# Patient Record
Sex: FEMALE | Race: OTHER | Employment: FULL TIME | URBAN - METROPOLITAN AREA
[De-identification: names, ages, dates, MRNs, and addresses within clinical notes are randomized per-mention and may not be internally consistent; named-entity substitution may affect disease eponyms.]

---

## 2017-05-15 ENCOUNTER — OFFICE VISIT (OUTPATIENT)
Dept: FAMILY MEDICINE CLINIC | Age: 37
End: 2017-05-15

## 2017-05-15 VITALS
HEIGHT: 64 IN | WEIGHT: 184 LBS | HEART RATE: 84 BPM | SYSTOLIC BLOOD PRESSURE: 104 MMHG | OXYGEN SATURATION: 98 % | BODY MASS INDEX: 31.41 KG/M2 | TEMPERATURE: 98.6 F | DIASTOLIC BLOOD PRESSURE: 67 MMHG | RESPIRATION RATE: 18 BRPM

## 2017-05-15 DIAGNOSIS — Z01.419 WELL WOMAN EXAM WITH ROUTINE GYNECOLOGICAL EXAM: Primary | ICD-10-CM

## 2017-05-15 DIAGNOSIS — R07.89 ATYPICAL CHEST PAIN: ICD-10-CM

## 2017-05-15 NOTE — PATIENT INSTRUCTIONS

## 2017-05-15 NOTE — MR AVS SNAPSHOT
Visit Information Date & Time Provider Department Dept. Phone Encounter #  
 5/15/2017  8:45 AM Grace Ordonez MD [facility] [phone] [id number] Follow-up Instructions Return in about 1 year (around 5/15/2018). Upcoming Health Maintenance Date Due DTaP/Tdap/Td series (1 - Tdap) 6/11/1999 PAP AKA CERVICAL CYTOLOGY 6/11/1999 INFLUENZA AGE 9 TO ADULT 8/1/2017 Allergies as of 5/15/2017  Review Complete On: 5/15/2017 By: Ned Marino LPN Severity Noted Reaction Type Reactions Latex  05/15/2017    Hives Azithromycin  05/15/2017    Hives Sulfa (Sulfonamide Antibiotics)  05/15/2017    Hives Current Immunizations  Never Reviewed No immunizations on file. Not reviewed this visit You Were Diagnosed With   
  
 Codes Comments Well woman exam with routine gynecological exam    -  Primary ICD-10-CM: W66.926 ICD-9-CM: V72.31 Vitals BP Pulse Temp Resp Height(growth percentile) Weight(growth percentile) 104/67 (BP 1 Location: Right arm, BP Patient Position: Sitting) 84 98.6 °F (37 °C) (Oral) 18 5' 3.75\" (1.619 m) 184 lb (83.5 kg) LMP SpO2 BMI OB Status Smoking Status 04/22/2017 (Exact Date) 98% 31.83 kg/m2 Having regular periods Never Smoker Vitals History BMI and BSA Data Body Mass Index Body Surface Area  
 31.83 kg/m 2 1.94 m 2 Your Updated Medication List  
  
Notice  As of 5/15/2017  9:17 AM  
 You have not been prescribed any medications. We Performed the Following CBC W/O DIFF [48458 CPT(R)] HEMOGLOBIN A1C W/O EAG [10237 CPT(R)] LIPID PANEL [39308 CPT(R)] METABOLIC PANEL, COMPREHENSIVE [07376 CPT(R)] PAP IG, APTIMA HPV AND RFX 16/18,45 ([id number]) [[id number] Custom] Follow-up Instructions Return in about 1 year (around 5/15/2018). Patient Instructions Well Visit, Ages 25 to 48: Care Instructions Your Care Instructions Physical exams can help you stay healthy. Your doctor has checked your overall health and may have suggested ways to take good care of yourself. He or she also may have recommended tests. At home, you can help prevent illness with healthy eating, regular exercise, and other steps. Follow-up care is a key part of your treatment and safety. Be sure to make and go to all appointments, and call your doctor if you are having problems. It's also a good idea to know your test results and keep a list of the medicines you take. How can you care for yourself at home? · Reach and stay at a healthy weight. This will lower your risk for many problems, such as obesity, diabetes, heart disease, and high blood pressure. · Get at least 30 minutes of physical activity on most days of the week. Walking is a good choice. You also may want to do other activities, such as running, swimming, cycling, or playing tennis or team sports. Discuss any changes in your exercise program with your doctor. · Do not smoke or allow others to smoke around you. If you need help quitting, talk to your doctor about stop-smoking programs and medicines. These can increase your chances of quitting for good. · Talk to your doctor about whether you have any risk factors for sexually transmitted infections (STIs). Having one sex partner (who does not have STIs and does not have sex with anyone else) is a good way to avoid these infections. · Use birth control if you do not want to have children at this time. Talk with your doctor about the choices available and what might be best for you. · Protect your skin from too much sun. When you're outdoors from 10 a.m. to 4 p.m., stay in the shade or cover up with clothing and a hat with a wide brim. Wear sunglasses that block UV rays. Even when it's cloudy, put broad-spectrum sunscreen (SPF 30 or higher) on any exposed skin. · See a dentist one or two times a year for checkups and to have your teeth cleaned. · Wear a seat belt in the car. · Drink alcohol in moderation, if at all. That means no more than 2 drinks a day for men and 1 drink a day for women. Follow your doctor's advice about when to have certain tests. These tests can spot problems early. For everyone · Cholesterol. Have the fat (cholesterol) in your blood tested after age 21. Your doctor will tell you how often to have this done based on your age, family history, or other things that can increase your risk for heart disease. · Blood pressure. Have your blood pressure checked during a routine doctor visit. Your doctor will tell you how often to check your blood pressure based on your age, your blood pressure results, and other factors. · Vision. Talk with your doctor about how often to have a glaucoma test. 
· Diabetes. Ask your doctor whether you should have tests for diabetes. · Colon cancer. Have a test for colon cancer at age 48. You may have one of several tests. If you are younger than 48, you may need a test earlier if you have any risk factors. Risk factors include whether you already had a precancerous polyp removed from your colon or whether your parent, brother, sister, or child has had colon cancer. For women · Breast exam and mammogram. Talk to your doctor about when you should have a clinical breast exam and a mammogram. Medical experts differ on whether and how often women under 50 should have these tests. Your doctor can help you decide what is right for you. · Pap test and pelvic exam. Begin Pap tests at age 24. A Pap test is the best way to find cervical cancer. The test often is part of a pelvic exam. Ask how often to have this test. 
· Tests for sexually transmitted infections (STIs). Ask whether you should have tests for STIs. You may be at risk if you have sex with more than one person, especially if your partners do not wear condoms. For men · Tests for sexually transmitted infections (STIs).  Ask whether you should have tests for STIs. You may be at risk if you have sex with more than one person, especially if you do not wear a condom. · Testicular cancer exam. Ask your doctor whether you should check your testicles regularly. · Prostate exam. Talk to your doctor about whether you should have a blood test (called a PSA test) for prostate cancer. Experts differ on whether and when men should have this test. Some experts suggest it if you are older than 39 and are -American or have a father or brother who got prostate cancer when he was younger than 72. When should you call for help? Watch closely for changes in your health, and be sure to contact your doctor if you have any problems or symptoms that concern you. Where can you learn more? Go to http://michelle-charity.info/. Enter P072 in the search box to learn more about \"Well Visit, Ages 25 to 48: Care Instructions. \" Current as of: July 19, 2016 Content Version: 11.2 © 5248-3217 [employer]. Care instructions adapted under license by [employer] (which disclaims liability or warranty for this information). If you have questions about a medical condition or this instruction, always ask your healthcare professional. [place] [address] any warranty or liability for your use of this information. Introducing [facility]! [employer] introduces XO1 patient portal. Now you can access parts of your medical record, email your doctor's office, and request medication refills online. 1. In your internet browser, go to [email or web address] 2. Click on the First Time User? Click Here link in the Sign In box. You will see the New Member Sign Up page. 3. Enter your XO1 Access Code exactly as it appears below. You will not need to use this code after youve completed the sign-up process. If you do not sign up before the expiration date, you must request a new code. · Prosper Access Code: [email or web address] Expires: 8/13/2017  9:17 AM 
 
4. Enter the last four digits of your Social Security Number (xxxx) and Date of Birth (mm/dd/yyyy) as indicated and click Submit. You will be taken to the next sign-up page. 5. Create a Prosper ID. This will be your Prosper login ID and cannot be changed, so think of one that is secure and easy to remember. 6. Create a Prosper password. You can change your password at any time. 7. Enter your Password Reset Question and Answer. This can be used at a later time if you forget your password. 8. Enter your e-mail address. You will receive e-mail notification when new information is available in [address] 9. Click Sign Up. You can now view and download portions of your medical record. 10. Click the Download Summary menu link to download a portable copy of your medical information. If you have questions, please visit the Frequently Asked Questions section of the Prosper website. Remember, Prosper is NOT to be used for urgent needs. For medical emergencies, dial 911. Now available from your iPhone and Android! Please provide this summary of care documentation to your next provider. If you have any questions after today's visit, please call [phone].

## 2017-05-15 NOTE — PROGRESS NOTES
Subjective:   Shantel Castro is an 43 y.o. female who presents for complete physical exam.     Doing well. Health in general is good. Denies smoking or frequent alcohol use. Had tubal ligation. Regular periods. Diet: liberal  Exercise: none    Complains of left sided chest pain. Feels like a \"muscle strain\". Had this worked up before by PCP. EKG was normal. Pain present at any time, no correlation with exercise. Pain may last for days when it comes. Allergies - reviewed: Allergies   Allergen Reactions    Latex Hives    Azithromycin Hives    Sulfa (Sulfonamide Antibiotics) Hives         Medications - reviewed:  No current outpatient prescriptions on file. No current facility-administered medications for this visit. Past Medical History - reviewed:  History reviewed. No pertinent past medical history. Past Surgical History - reviewed:  Past Surgical History:   Procedure Laterality Date    HX TUBAL LIGATION  2005         Family History - reviewed:  History reviewed. No pertinent family history. Social History - reviewed:  Social History     Social History    Marital status:      Spouse name: N/A    Number of children: N/A    Years of education: N/A     Occupational History    Not on file.      Social History Main Topics    Smoking status: Never Smoker    Smokeless tobacco: Not on file    Alcohol use No    Drug use: No    Sexual activity: Yes     Partners: Female     Birth control/ protection: Surgical     Other Topics Concern    Not on file     Social History Narrative    No narrative on file         Review of Systems   CONSTITUTIONAL: Denies: fever  EYES: Denies: decreased vision  ENT: Denies: sore throat  CARDIOVASCULAR: chest pain  RESPIRATORY: Denies: cough  ENDOCRINE: Denies: palpitations  GI: Denies: abdominal pain  : Denies: dysuria  NEURO: Denies: headache  MUSCULOSKELETAL: Denies: joint pain  SKIN: Denies: rash  PSYCH: Denies: anxiety      Objective: Visit Vitals    /67 (BP 1 Location: Right arm, BP Patient Position: Sitting)    Pulse 84    Temp 98.6 °F (37 °C) (Oral)    Resp 18    Ht 5' 3.75\" (1.619 m)    Wt 184 lb (83.5 kg)    LMP 04/22/2017 (Exact Date)    SpO2 98%    BMI 31.83 kg/m2       General appearance - alert, well appearing, and in no distress  Eyes - pupils equal and reactive, extraocular eye movements intact  Ears - bilateral TM's and external ear canals normal  Nose - normal and patent, no erythema, discharge or polyps  Mouth - mucous membranes moist, pharynx normal without lesions  Neck - supple  Chest - clear to auscultation, no wheezes, rales or rhonchi, symmetric air entry  Heart - normal rate, regular rhythm, normal S1, S2, no murmurs  Abdomen - soft, nontender, nondistended  Neurological - alert, oriented, normal speech, no focal findings or movement disorder noted  Musculoskeletal - no joint tenderness, deformity or swelling  Extremities - no pedal edema  Skin - normal coloration and turgor      Assessment:       ICD-10-CM ICD-9-CM    1. Well woman exam with routine gynecological exam Z01.419 V72.31 CBC W/O DIFF      METABOLIC PANEL, COMPREHENSIVE      LIPID PANEL      HEMOGLOBIN A1C W/O EAG      PAP IG, APTIMA HPV AND RFX 16/18,45 ([id number])      STRESS TEST CARDIAC   2. Atypical chest pain R07.89 786.59            Plan:   · Counseled re: diet, exercise, healthy lifestyle    · Appropriate labs done today. · Atypical chest pain - chronic: had this worked up by PCP before, but never had stress test. Will obtain stress test. Patient denied having pain currently. · The patient was counseled on the dangers of tobacco use, and was advised to quit. Reviewed strategies to maximize success, including written materials. Follow-up Disposition:  Return if symptoms worsen or fail to improve. I have discussed the diagnosis with the patient and the intended plan as seen in the above orders.   The patient has received an after-visit summary and questions were answered concerning future plans. I have discussed medication side effects and warnings with the patient as well. Informed pt to return to the office if new symptoms arise.       Stone Briones MD  Family Medicine Resident

## 2017-05-15 NOTE — PROGRESS NOTES
Chief Complaint   Patient presents with   [facility].

## 2017-05-16 LAB
ALBUMIN SERPL-MCNC: 4.4 G/DL (ref 3.5–5.5)
ALBUMIN/GLOB SERPL: 1.5 {RATIO} (ref 1.2–2.2)
ALP SERPL-CCNC: 74 IU/L (ref 39–117)
ALT SERPL-CCNC: 19 IU/L (ref 0–32)
AST SERPL-CCNC: 37 IU/L (ref 0–40)
BILIRUB SERPL-MCNC: 0.4 MG/DL (ref 0–1.2)
BUN SERPL-MCNC: 11 MG/DL (ref 6–20)
BUN/CREAT SERPL: 11 (ref 9–23)
CALCIUM SERPL-MCNC: 9.1 MG/DL (ref 8.7–10.2)
CHLORIDE SERPL-SCNC: 103 MMOL/L (ref 96–106)
CHOLEST SERPL-MCNC: 192 MG/DL (ref 100–199)
CO2 SERPL-SCNC: 22 MMOL/L (ref 18–29)
CREAT SERPL-MCNC: 0.98 MG/DL (ref 0.57–1)
ERYTHROCYTE [DISTWIDTH] IN BLOOD BY AUTOMATED COUNT: 12.8 % (ref 12.3–15.4)
GLOBULIN SER CALC-MCNC: 2.9 G/DL (ref 1.5–4.5)
GLUCOSE SERPL-MCNC: 91 MG/DL (ref 65–99)
HBA1C MFR BLD: 5.9 % (ref 4.8–5.6)
HCT VFR BLD AUTO: 40.6 % (ref 34–46.6)
HDLC SERPL-MCNC: 76 MG/DL
HGB BLD-MCNC: 13.1 G/DL (ref 11.1–15.9)
INTERPRETATION, 910389: NORMAL
LDLC SERPL CALC-MCNC: 98 MG/DL (ref 0–99)
MCH RBC QN AUTO: 29.2 PG (ref 26.6–33)
MCHC RBC AUTO-ENTMCNC: 32.3 G/DL (ref 31.5–35.7)
MCV RBC AUTO: 90 FL (ref 79–97)
PLATELET # BLD AUTO: 237 X10E3/UL (ref 150–379)
POTASSIUM SERPL-SCNC: 4.7 MMOL/L (ref 3.5–5.2)
PROT SERPL-MCNC: 7.3 G/DL (ref 6–8.5)
RBC # BLD AUTO: 4.49 X10E6/UL (ref 3.77–5.28)
SODIUM SERPL-SCNC: 140 MMOL/L (ref 134–144)
TRIGL SERPL-MCNC: 88 MG/DL (ref 0–149)
VLDLC SERPL CALC-MCNC: 18 MG/DL (ref 5–40)
WBC # BLD AUTO: 8.6 X10E3/UL (ref 3.4–10.8)

## 2017-05-18 LAB
CYTOLOGIST CVX/VAG CYTO: ABNORMAL
CYTOLOGY CVX/VAG DOC THIN PREP: ABNORMAL
DX ICD CODE: ABNORMAL
DX ICD CODE: ABNORMAL
HPV I/H RISK 4 DNA CVX QL PROBE+SIG AMP: POSITIVE
Lab: ABNORMAL
OTHER STN SPEC: ABNORMAL
PATH REPORT.FINAL DX SPEC: ABNORMAL
PATHOLOGIST CVX/VAG CYTO: ABNORMAL
STAT OF ADQ CVX/VAG CYTO-IMP: ABNORMAL

## 2017-05-30 ENCOUNTER — TELEPHONE (OUTPATIENT)
Dept: FAMILY MEDICINE CLINIC | Age: 37
End: 2017-05-30

## 2017-05-30 NOTE — TELEPHONE ENCOUNTER
----- Message from Renae Cervantes sent at 5/30/2017  7:57 AM EDT -----  Regarding: Dr. Bard Gaviria telephone  Contact: [phone]  Pt is requesting a call back regarding a letter she received to schedule a colposcopy and she also had some others questions.  Pt's best contact number is [phone]

## 2017-06-02 ENCOUNTER — OFFICE VISIT (OUTPATIENT)
Dept: FAMILY MEDICINE CLINIC | Age: 37
End: 2017-06-02

## 2017-06-02 VITALS
WEIGHT: 184 LBS | SYSTOLIC BLOOD PRESSURE: 109 MMHG | RESPIRATION RATE: 16 BRPM | OXYGEN SATURATION: 98 % | HEART RATE: 63 BPM | HEIGHT: 64 IN | TEMPERATURE: 98.2 F | BODY MASS INDEX: 31.41 KG/M2 | DIASTOLIC BLOOD PRESSURE: 75 MMHG

## 2017-06-02 DIAGNOSIS — R87.629 ABNORMAL VAGINAL PAP SMEAR: Primary | ICD-10-CM

## 2017-06-02 LAB
HCG URINE, QL. (POC): NEGATIVE
VALID INTERNAL CONTROL?: YES

## 2017-06-02 RX ORDER — ALPRAZOLAM 1 MG/1
TABLET ORAL
Qty: 4 TAB | Refills: 0 | Status: SHIPPED | OUTPATIENT
Start: 2017-06-02 | End: 2018-08-08 | Stop reason: SDUPTHER

## 2017-06-02 NOTE — PROGRESS NOTES
Galileo Alatorre is a 42 y.o.  who presented for colposcopy however was unable to tolerate the procedure. She was crying and asking to be sedated. Her history was reviewed below:    Pap smear on 5/15/2017 showed ASCUS HPV positive. There is no prior history of cervical/vaginal disease. There is no prior history of cervical treatment. Past Gyn Procedures/Surgeries: None    Patient's last menstrual period was 2017 (exact date). Sexual history:     Method of contraception:  none    Iodine allergy?:  no    Has hives with latex    UPT negative today      Time out was performed and consent was obtained. However patient tearful and nervous about having the procedure done today. The office air conditioning was also not working and the room was over heated. Procedure Details   The risks and benefits of the procedure and written informed consent obtained. Speculum placed in vagina and it was very difficult to locate and visualize the cervix due to patient clamping down. A second attempt was made by attending Dr. Wing Richmond but was again unsuccessful. Decision was made at that time to stop the procedure and have the patient return with anxiolytic. Assessment:  Colposcopy procedure aborted due to patient inability to tolerate 2/2 anxiety     Plan:  Rx written for xanax   Advised patient to take one tablet the morning of the procedure and then 15-20 minutes immediately prior to colposcopy. She will reschedule in 2 weeks.        Vanessa Norton MD  Family Medicine Resident

## 2017-06-19 ENCOUNTER — OFFICE VISIT (OUTPATIENT)
Dept: FAMILY MEDICINE CLINIC | Age: 37
End: 2017-06-19

## 2017-06-19 VITALS
BODY MASS INDEX: 31.41 KG/M2 | HEIGHT: 64 IN | TEMPERATURE: 98.2 F | RESPIRATION RATE: 18 BRPM | SYSTOLIC BLOOD PRESSURE: 109 MMHG | WEIGHT: 184 LBS | DIASTOLIC BLOOD PRESSURE: 74 MMHG | OXYGEN SATURATION: 98 %

## 2017-06-19 DIAGNOSIS — Z01.818 PRE-PROCEDURAL EXAMINATION: ICD-10-CM

## 2017-06-19 DIAGNOSIS — N87.9 CERVICAL DYSPLASIA: Primary | ICD-10-CM

## 2017-06-19 NOTE — PROGRESS NOTES
Colposcopy Procedure Note    Maria Antonia Sorto is a 42 y.o. who is here for colposcopy. Pap smear on 5/15/17 showed ASCUS, HRHPV+    Dysplasia Hx:   none    Past Gyn Procedures/Surgeries: denies    Method of contraception:  BTL    Iodine allergy?:  No    UPT neg today    [facility]  OFFICE PROCEDURE PROGRESS NOTE    Chart reviewed for the following:   Shantelle CORONEL DO, have reviewed the History, Physical and updated the Allergic reactions for [address] performed immediately prior to start of procedure:   Shantelle CORONEL DO, have performed the following reviews on Maria Antonia Sorto prior to the start of the procedure:            * Patient was identified by name and date of birth   * Agreement on procedure being performed was verified  * Risks and Benefits explained to the patient  * Procedure site verified and marked as necessary  * Patient was positioned for comfort  * Consent was signed and verified     Time: 3:58 PM    Date of procedure: 6/19/2017    Procedure performed by: Ky Ponce DO    Provider assisted by: Alexandria Boston LPN      How tolerated by patient: tolerated the procedure well with no complications    Comments: none      Procedure Details   The risks and benefits of the procedure and written informed consent obtained. Speculum placed in vagina and excellent visualization of cervix achieved, cervix swabbed with acetic acid solution and viewed through colposcope. Findings:  Cervix: SCJ not visualized, in os, no visible lesions seen     Vaginal inspection: normal without visible lesions surrounding cervical edges     Vulvar colposcopy: vulvar colposcopy not performed. Specimens: ECC    Complications: none. Assessment:  42 y.o. s/p colposcopy for cervical dysplasia. Plan:  Specimens labelled and sent to Pathology. Will base further treatment on Pathology findings. Treatment options discussed with patient.   Post biopsy instructions given to patient. Mariela Alva DO  Family Medicine Faculty

## 2017-06-19 NOTE — LETTER
NOTIFICATION RETURN TO WORK / SCHOOL 
 
6/19/2017 4:29 PM 
 
Ms. Ascencion Escobar [id number] [place] [address] [phone] To Whom It May Concern: 
 
Ascencion Escobar is currently under the care of [facility]. She will return to work/school on: 6/21/17 If there are questions or concerns please have the patient contact our office. Sincerely, Shantelle Jamison, DO

## 2017-06-23 LAB
DX ICD CODE: NORMAL
DX ICD CODE: NORMAL
PATH REPORT.FINAL DX SPEC: NORMAL
PATH REPORT.GROSS SPEC: NORMAL
PATH REPORT.SITE OF ORIGIN SPEC: NORMAL
PATHOLOGIST NAME: NORMAL
PAYMENT PROCEDURE: NORMAL

## 2017-07-05 ENCOUNTER — OFFICE VISIT (OUTPATIENT)
Dept: FAMILY MEDICINE CLINIC | Age: 37
End: 2017-07-05

## 2017-07-05 VITALS
DIASTOLIC BLOOD PRESSURE: 74 MMHG | OXYGEN SATURATION: 99 % | BODY MASS INDEX: 31.41 KG/M2 | WEIGHT: 184 LBS | RESPIRATION RATE: 18 BRPM | TEMPERATURE: 98.3 F | SYSTOLIC BLOOD PRESSURE: 109 MMHG | HEIGHT: 64 IN | HEART RATE: 84 BPM

## 2017-07-05 DIAGNOSIS — Z11.59 MEASLES SCREENING: ICD-10-CM

## 2017-07-05 DIAGNOSIS — Z02.89 HISTORY AND PHYSICAL EXAMINATION, OCCUPATION: Primary | ICD-10-CM

## 2017-07-05 DIAGNOSIS — Z02.89 HISTORY AND PHYSICAL EXAMINATION, OCCUPATION: ICD-10-CM

## 2017-07-05 DIAGNOSIS — T78.40XA ALLERGY, INITIAL ENCOUNTER: ICD-10-CM

## 2017-07-05 DIAGNOSIS — Z11.59 SCREENING FOR RUBELLA: ICD-10-CM

## 2017-07-05 NOTE — PATIENT INSTRUCTIONS

## 2017-07-05 NOTE — PROGRESS NOTES
Keely Browne is an 42 y.o. female who presents for immunization titers for new job. Got a new job at [employer] in [place], [place]. Paperwork with her, includes latex allergy screening and immunization titers. No hx of positive titers in past.    No recent travels or possible exposure    Doing well. Health in general is good. Denies smoking or frequent alcohol use. Had tubal ligation. Regular periods. Diet: controlled  Exercise: none    Allergies - reviewed: Allergies   Allergen Reactions    Latex Hives    Azithromycin Hives    Sulfa (Sulfonamide Antibiotics) Hives         Medications - reviewed:   Current Outpatient Prescriptions   Medication Sig    ALPRAZolam (XANAX) 1 mg tablet Take one tab by mouth the morning of procedure and then 20 minutes prior to procedure     No current facility-administered medications for this visit. Past Medical History - reviewed:  No past medical history on file. Past Surgical History - reviewed:   Past Surgical History:   Procedure Laterality Date    HX TUBAL LIGATION  2005         Social History - reviewed:  Social History     Social History    Marital status:      Spouse name: N/A    Number of children: N/A    Years of education: N/A     Occupational History    Not on file. Social History Main Topics    Smoking status: Never Smoker    Smokeless tobacco: Not on file    Alcohol use No    Drug use: No    Sexual activity: Yes     Partners: Female     Birth control/ protection: Surgical     Other Topics Concern    Not on file     Social History Narrative         Family History - reviewed:  No family history on file.       ROS  CONSTITUTIONAL: Denies: fever  EYES: Denies: decreased vision  ENT: Denies: sore throat  CARDIOVASCULAR: Denies: chest pain  RESPIRATORY: Denies: cough  ENDOCRINE: Denies: palpitations  GI: Denies: abdominal pain  : Denies: dysuria  NEURO: Denies: headache  MUSCULOSKELETAL: Denies: joint pain  SKIN: Denies: rash  PSYCH: Denies: anxiety      Physical Exam  Visit Vitals    /74    Pulse 84    Temp 98.3 °F (36.8 °C) (Oral)    Resp 18    Ht 5' 3.75\" (1.619 m)    Wt 184 lb (83.5 kg)    LMP 06/11/2017    SpO2 99%    BMI 31.83 kg/m2       General appearance - alert, well appearing, and in no distress  Eyes - pupils equal and reactive, extraocular eye movements intact  Ears - bilateral TM's and external ear canals normal  Nose - normal and patent, no erythema, discharge or polyps  Mouth - mucous membranes moist, pharynx normal without lesions  Neck - supple  Chest - clear to auscultation, no wheezes, rales or rhonchi, symmetric air entry  Heart - normal rate, regular rhythm, normal S1, S2, no murmurs  Abdomen - soft, nontender, nondistended  Neurological - alert, oriented, normal speech, no focal findings or movement disorder noted  Musculoskeletal - no joint tenderness, deformity or swelling  Extremities - no pedal edema  Skin - normal coloration and turgor    Latex screening: shows that pt is latex sensitive. Assessment/Plan    ICD-10-CM ICD-9-CM    1. History and physical examination, occupation Z02.89 V70.3 VZV AB, IGG      RUBEOLA AB, IGG      MUMPS AB, IGG      RUBELLA AB, IGG   2. Allergy, initial encounter T78.40XA 995.3 REFERRAL TO ALLERGY   3. Screening for rubella Z11.59 V73.3 RUBELLA AB, IGG   4. Measles screening Z11.59 V73.2 RUBEOLA AB, IGG     Titers collected. Paperwork to be completed once the labs are resulted. Gave pt the original copies and will keep a copy in my mail box to complete once the titers resulted. Pt will be notified when all paperwork completed     Based on the paper work from her job, it requires pt obtain an allergy test if latex screening is positive. Therefore, will refer to allergy specialist.     Follow-up Disposition:  Return if symptoms worsen or fail to improve.       I have discussed the diagnosis with the patient and the intended plan as seen in the above orders. Patient verbalized understanding of the plan and agrees with the plan. The patient has received an after-visit summary and questions were answered concerning future plans.         Felix House DO  Family Medicine Resident

## 2017-07-05 NOTE — MR AVS SNAPSHOT
Visit Information Date & Time Provider Department Dept. Phone Encounter #  
 7/5/2017  8:00 AM Jaylen Lawson DO [address] [phone] [id number] Upcoming Health Maintenance Date Due DTaP/Tdap/Td series (1 - Tdap) 6/11/1999 INFLUENZA AGE 9 TO ADULT 8/1/2017 PAP AKA CERVICAL CYTOLOGY 5/15/2020 Allergies as of 7/5/2017  Review Complete On: 7/5/2017 By: Jaylen Lawson DO Severity Noted Reaction Type Reactions Latex  05/15/2017    Hives Azithromycin  05/15/2017    Hives Sulfa (Sulfonamide Antibiotics)  05/15/2017    Hives Current Immunizations  Never Reviewed No immunizations on file. Not reviewed this visit You Were Diagnosed With   
  
 Codes Comments History and physical examination, occupation    -  Primary ICD-10-CM: Z02.89 ICD-9-CM: V70.3 Allergy, initial encounter     ICD-10-CM: T78.40XA ICD-9-CM: 995.3 Screening for rubella     ICD-10-CM: Z11.59 
ICD-9-CM: V73.3 Measles screening     ICD-10-CM: Z11.59 
ICD-9-CM: V73.2 Vitals BP Pulse Temp Resp Height(growth percentile) Weight(growth percentile) 109/74 84 98.3 °F (36.8 °C) (Oral) 18 5' 3.75\" (1.619 m) 184 lb (83.5 kg) LMP SpO2 BMI OB Status Smoking Status 06/11/2017 99% 31.83 kg/m2 Having regular periods Never Smoker Vitals History BMI and BSA Data Body Mass Index Body Surface Area  
 31.83 kg/m 2 1.94 m 2 Preferred Pharmacy Pharmacy Name Phone [facility] [address], [address] [phone] Your Updated Medication List  
  
   
This list is accurate as of: 7/5/17  8:44 AM.  Always use your most recent med list.  
  
  
  
  
 ALPRAZolam 1 mg tablet Commonly known as:  Rosalene Luis A Take one tab by mouth the morning of procedure and then 20 minutes prior to procedure We Performed the Following [address], [address] CPT(R)] REFERRAL TO ALLERGY [REF5 Custom] Comments:  
 Please evaluate patient for allergy testing for latex Aiden Perkins. MD [facility] [address] [place] [address] Phone: [phone] Aren Dela Cruz [facility] [id number] CPT(R)] [facility] [id number] CPT(R)] To-Do List   
 07/05/2017 Lab:  [facility] Referral Information Referral ID Referred By Referred To  
  
 [id number] Ana Gallego Not Available Visits Status Start Date End Date 1 New Request 7/5/17 7/5/18 If your referral has a status of pending review or denied, additional information will be sent to support the outcome of this decision. Patient Instructions Eating Healthy Foods: Care Instructions Your Care Instructions Eating healthy foods can help lower your risk for disease. Healthy food gives you energy and keeps your heart strong, your brain active, your muscles working, and your bones strong. A healthy diet includes a variety of foods from the basic food groups: grains, vegetables, fruits, milk and milk products, and meat and beans. Some people may eat more of their favorite foods from only one food group and, as a result, miss getting the nutrients they need. So, it is important to pay attention not only to what you eat but also to what you are missing from your diet. You can eat a healthy, balanced diet by making a few small changes. Follow-up care is a key part of your treatment and safety. Be sure to make and go to all appointments, and call your doctor if you are having problems. It's also a good idea to know your test results and keep a list of the medicines you take. How can you care for yourself at home? Look at what you eat · Keep a food diary for a week or two and record everything you eat or drink. Track the number of servings you eat from each food group. · For a balanced diet every day, eat a variety of: ¨ 6 or more ounce-equivalents of grains, such as cereals, breads, crackers, rice, or pasta, every day. An ounce-equivalent is 1 slice of bread, 1 cup of ready-to-eat cereal, or ½ cup of cooked rice, cooked pasta, or cooked cereal. 
¨ 2½ cups of vegetables, especially: § Dark-green vegetables such as broccoli and spinach. § Orange vegetables such as carrots and sweet potatoes. § Dry beans (such as espinoza and kidney beans) and peas (such as lentils). ¨ 2 cups of fresh, frozen, or canned fruit. A small apple or 1 banana or orange equals 1 cup. ¨ 3 cups of nonfat or low-fat milk, yogurt, or other milk products. ¨ 5½ ounces of meat and beans, such as chicken, fish, lean meat, beans, nuts, and seeds. One egg, 1 tablespoon of peanut butter, ½ ounce nuts or seeds, or ¼ cup of cooked beans equals 1 ounce of meat. · Learn how to read food labels for serving sizes and ingredients. Fast-food and convenience-food meals often contain few or no fruits or vegetables. Make sure you eat some fruits and vegetables to make the meal more nutritious. · Look at your food diary. For each food group, add up what you have eaten and then divide the total by the number of days. This will give you an idea of how much you are eating from each food group. See if you can find some ways to change your diet to make it more healthy. Start small · Do not try to make dramatic changes to your diet all at once. You might feel that you are missing out on your favorite foods and then be more likely to fail. · Start slowly, and gradually change your habits. Try some of the following: ¨ Use whole wheat bread instead of white bread. ¨ Use nonfat or low-fat milk instead of whole milk. ¨ Eat brown rice instead of white rice, and eat whole wheat pasta instead of white-flour pasta. ¨ Try low-fat cheeses and low-fat yogurt. ¨ Add more fruits and vegetables to meals and have them for snacks. ¨ Add lettuce, tomato, cucumber, and onion to sandwiches. ¨ Add fruit to yogurt and cereal. 
Enjoy food · You can still eat your favorite foods. You just may need to eat less of them. If your favorite foods are high in fat, salt, and sugar, limit how often you eat them, but do not cut them out entirely. · Eat a wide variety of foods. Make healthy choices when eating out · The type of restaurant you choose can help you make healthy choices. Even fast-food chains are now offering more low-fat or healthier choices on the menu. · Choose smaller portions, or take half of your meal home. · When eating out, try: ¨ A veggie pizza with a whole wheat crust or grilled chicken (instead of sausage or pepperoni). ¨ Pasta with roasted vegetables, grilled chicken, or marinara sauce instead of cream sauce. ¨ A vegetable wrap or grilled chicken wrap. ¨ Broiled or poached food instead of fried or breaded items. Make healthy choices easy · Buy packaged, prewashed, ready-to-eat fresh vegetables and fruits, such as baby carrots, salad mixes, and chopped or shredded broccoli and cauliflower. · Buy packaged, presliced fruits, such as melon or pineapple. · Choose 100% fruit or vegetable juice instead of soda. Limit juice intake to 4 to 6 oz (½ to ¾ cup) a day. · Blend low-fat yogurt, fruit juice, and canned or frozen fruit to make a smoothie for breakfast or a snack. Where can you learn more? Go to http://michelle-charity.info/. Enter T756 in the search box to learn more about \"Eating Healthy Foods: Care Instructions. \" Current as of: April 3, 2017 Content Version: 11.3 © 2292-7427 [employer]. Care instructions adapted under license by [employer] (which disclaims liability or warranty for this information). If you have questions about a medical condition or this instruction, always ask your healthcare professional. [place] [address] any warranty or liability for your use of this information. Introducing \[facility]\"" & HEALTH SERVICES! [facility] introduces [employer] patient portal. Now you can access parts of your medical record, email your doctor's office, and request medication refills online. 1. In your internet browser, go to [email or web address] 2. Click on the First Time User? Click Here link in the Sign In box. You will see the New Member Sign Up page. 3. Enter your [employer] Access Code exactly as it appears below. You will not need to use this code after youve completed the sign-up process. If you do not sign up before the expiration date, you must request a new code. · [employer] Access Code: [email or web address] Expires: 8/13/2017  9:17 AM 
 
4. Enter the last four digits of your Social Security Number (xxxx) and Date of Birth (mm/dd/yyyy) as indicated and click Submit. You will be taken to the next sign-up page. 5. Create a [employer] ID. This will be your [employer] login ID and cannot be changed, so think of one that is secure and easy to remember. 6. Create a [employer] password. You can change your password at any time. 7. Enter your Password Reset Question and Answer. This can be used at a later time if you forget your password. 8. Enter your e-mail address. You will receive e-mail notification when new information is available in [address] 9. Click Sign Up. You can now view and download portions of your medical record. 10. Click the Download Summary menu link to download a portable copy of your medical information. If you have questions, please visit the Frequently Asked Questions section of the [employer] website. Remember, [employer] is NOT to be used for urgent needs. For medical emergencies, dial 911. Now available from your iPhone and Android! Please provide this summary of care documentation to your next provider. Your primary care clinician is listed as Grace Holloway. If you have any questions after today's visit, please call [phone].

## 2017-07-06 LAB
MEV IGG SER IA-ACNC: 97.5 AU/ML
MUV IGG SER IA-ACNC: 154 AU/ML
RUBV IGG SERPL IA-ACNC: 21.9 INDEX
VZV IGG SER IA-ACNC: >4000 INDEX

## 2017-08-25 ENCOUNTER — TELEPHONE (OUTPATIENT)
Dept: FAMILY MEDICINE CLINIC | Age: 37
End: 2017-08-25

## 2017-08-25 NOTE — TELEPHONE ENCOUNTER
[phone]  Patient is calling to ask if the history and physical form can be revised as she does not need a stress test.  She is in good health but only the family history has this health issue. Please call when form is ready as she wants to know, but she does want it mailed to her.

## 2017-08-28 NOTE — TELEPHONE ENCOUNTER
Returned call to patient regarding forms. She stated forms had some information about a stress test being needed, Per  and form scan in patient chart no evidence of this.  Asked patient to bring form she is referring to that references her needing a stress test.

## 2017-09-19 NOTE — TELEPHONE ENCOUNTER
Per Patient her insurance company has on file that a stress test was ordered for her. As a result her employer is thinking she has stress issues. Patient is asking that her medical record be amended. Patient notes she complained of a pulled muscle do to working out and states that was the issue for the pain. Please contact patient ASAP with a response.         Phone # [phone]

## 2017-10-02 NOTE — TELEPHONE ENCOUNTER
Spoke with patient and she asked if provider could write stress test not needed on paperwork she dropped off. Will ask provider when she is in office.

## 2017-10-02 NOTE — TELEPHONE ENCOUNTER
Patient is calling back and expressed frustration. Patient states she have been trying for months mow to get her office notes amended to have the mentioning of stress test removed. Patient states she didn't come in with any cardiac issues and only complained of a pulled muscle. Patient states she's trying to get life insurance and with it showing she need to have the stress test, it's causing issues. I relayed message per MD notes and patient wasn't satisfied with that. Patient states she feels as if she's getting the run around. Patient states she didn't receive a call. I verified phone number as [phone] and patient stated that was correct. Call transferred to nurse Laura Tejada.  For further assistance.

## 2017-10-05 ENCOUNTER — DOCUMENTATION ONLY (OUTPATIENT)
Dept: FAMILY MEDICINE CLINIC | Age: 37
End: 2017-10-05

## 2017-10-05 NOTE — PROGRESS NOTES
Patient was seen in the office on 5/15/17 and complained of chest discomfort. At that time, recommended patient have stress testing done. But upon further questioning, she denied chest discomfort. Stated instead that she pulled a muscle. She does not smoke. No fam hx early MI. Will hold off on cardiac testing at this time.

## 2018-07-19 ENCOUNTER — OFFICE VISIT (OUTPATIENT)
Dept: FAMILY MEDICINE CLINIC | Age: 38
End: 2018-07-19

## 2018-07-19 VITALS
DIASTOLIC BLOOD PRESSURE: 77 MMHG | TEMPERATURE: 97.5 F | OXYGEN SATURATION: 99 % | BODY MASS INDEX: 32.1 KG/M2 | RESPIRATION RATE: 16 BRPM | WEIGHT: 188 LBS | HEIGHT: 64 IN | SYSTOLIC BLOOD PRESSURE: 110 MMHG | HEART RATE: 84 BPM

## 2018-07-19 DIAGNOSIS — R73.03 PREDIABETES: ICD-10-CM

## 2018-07-19 DIAGNOSIS — Z11.3 SCREENING FOR STDS (SEXUALLY TRANSMITTED DISEASES): ICD-10-CM

## 2018-07-19 DIAGNOSIS — M79.672 LEFT FOOT PAIN: ICD-10-CM

## 2018-07-19 DIAGNOSIS — R63.5 WEIGHT GAIN: Primary | ICD-10-CM

## 2018-07-19 NOTE — MR AVS SNAPSHOT
[address] 
[phone] Patient: Zachary Guerra MRN: [id number] ZOR:[id number] Visit Information Date & Time Provider Department Dept. Phone Encounter #  
 7/19/2018  9:00 AM Laquita Pires MD [facility] [phone] [id number] Upcoming Health Maintenance Date Due DTaP/Tdap/Td series (1 - Tdap) 6/11/1999 Influenza Age 5 to Adult 8/1/2018 PAP AKA CERVICAL CYTOLOGY 5/15/2020 Allergies as of 7/19/2018  Review Complete On: 7/19/2018 By: Mary Carmen Whitfield LPN Severity Noted Reaction Type Reactions Latex  05/15/2017    Hives Azithromycin  05/15/2017    Hives Sulfa (Sulfonamide Antibiotics)  05/15/2017    Hives Current Immunizations  Never Reviewed No immunizations on file. Not reviewed this visit You Were Diagnosed With   
  
 Codes Comments Weight gain    -  Primary ICD-10-CM: R63.5 ICD-9-CM: 783.1 Prediabetes     ICD-10-CM: R73.03 
ICD-9-CM: 790.29 Screening for STDs (sexually transmitted diseases)     ICD-10-CM: Z11.3 ICD-9-CM: V74.5 Left foot pain     ICD-10-CM: I82.491 ICD-9-CM: 729.5 Vitals BP Pulse Temp Resp Height(growth percentile) Weight(growth percentile) 110/77 84 97.5 °F (36.4 °C) (Oral) 16 5' 3.75\" (1.619 m) 188 lb (85.3 kg) LMP SpO2 BMI OB Status Smoking Status 07/19/2018 99% 32.52 kg/m2 Having regular periods Never Smoker Vitals History BMI and BSA Data Body Mass Index Body Surface Area 32.52 kg/m 2 1.96 m 2 Preferred Pharmacy Pharmacy Name Phone [facility] [address] [place] [address] Drive, [address] [facility] [address] [phone] Your Updated Medication List  
  
   
This list is accurate as of 7/19/18 10:10 AM.  Always use your most recent med list.  
  
  
  
  
 ALPRAZolam 1 mg tablet Commonly known as:  Floyd Lozano Take one tab by mouth the morning of procedure and then 20 minutes prior to procedure We Performed the Following CBC W/O DIFF [57971 CPT(R)] HEMOGLOBIN A1C W/O EAG [00775 CPT(R)] HEPATITIS PANEL, ACUTE [94919 CPT(R)] HIV 1/2 AG/AB, 4TH GENERATION,W RFLX CONFIRM M7194961 CPT(R)] LIPID PANEL [38785 CPT(R)] METABOLIC PANEL, BASIC [70610 CPT(R)] TSH REFLEX TO T4 [40257 CPT(R)] Patient Instructions NSAIDS: Advil, ibuprofen HEEL CUP 
 
_________________________________ Plantar Fasciitis: Care Instructions Your Care Instructions Plantar fasciitis is pain and inflammation of the plantar fascia, the tissue at the bottom of your foot that connects the heel bone to the toes. The plantar fascia also supports the arch. If you strain the plantar fascia, it can develop small tears and cause heel pain when you stand or walk. Plantar fasciitis can be caused by running or other sports. It also may occur in people who are overweight or who have high arches or flat feet. You may get plantar fasciitis if you walk or stand for long periods, or have a tight Achilles tendon or calf muscles. You can improve your foot pain with rest and other care at home. It might take a few weeks to a few months for your foot to heal completely. Follow-up care is a key part of your treatment and safety. Be sure to make and go to all appointments, and call your doctor if you are having problems. It's also a good idea to know your test results and keep a list of the medicines you take. How can you care for yourself at home? · Rest your feet often. Reduce your activity to a level that lets you avoid pain. If possible, do not run or walk on hard surfaces. · Take pain medicines exactly as directed. ¨ If the doctor gave you a prescription medicine for pain, take it as prescribed.  
¨ If you are not taking a prescription pain medicine, take an over-the-counter anti-inflammatory medicine for pain and swelling, such as ibuprofen (Advil, Motrin) or naproxen (Aleve). Read and follow all instructions on the label. · Use ice massage to help with pain and swelling. You can use an ice cube or an ice cup several times a day. To make an ice cup, fill a paper cup with water and freeze it. Cut off the top of the cup until a half-inch of ice shows. Hold onto the remaining paper to use the cup. Rub the ice in small circles over the area for 5 to 7 minutes. · Contrast baths, which alternate hot and cold water, can also help reduce swelling. But because heat alone may make pain and swelling worse, end a contrast bath with a soak in cold water. · Wear a night splint if your doctor suggests it. A night splint holds your foot with the toes pointed up and the foot and ankle at a 90-degree angle. This position gives the bottom of your foot a constant, gentle stretch. · Do simple exercises such as calf stretches and towel stretches 2 to 3 times each day, especially when you first get up in the morning. These can help the plantar fascia become more flexible. They also make the muscles that support your arch stronger. Hold these stretches for 15 to 30 seconds per stretch. Repeat 2 to 4 times. ¨ Stand about 1 foot from a wall. Place the palms of both hands against the wall at chest level. Lean forward against the wall, keeping one leg with the knee straight and heel on the ground while bending the knee of the other leg. ¨ Sit down on the floor or a mat with your feet stretched in front of you. Roll up a towel lengthwise, and loop it over the ball of your foot. Holding the towel at both ends, gently pull the towel toward you to stretch your foot. · Wear shoes with good arch support. Athletic shoes or shoes with a well-cushioned sole are good choices. · Try heel cups or shoe inserts (orthotics) to help cushion your heel. You can buy these at many shoe stores. · Put on your shoes as soon as you get out of bed. Going barefoot or wearing slippers may make your pain worse. · Reach and stay at a good weight for your height. This puts less strain on your feet. When should you call for help? Call your doctor now or seek immediate medical care if: 
  · You have heel pain with fever, redness, or warmth in your heel.  
  · You cannot put weight on the sore foot.  
 Watch closely for changes in your health, and be sure to contact your doctor if: 
  · You have numbness or tingling in your heel.  
  · Your heel pain lasts more than 2 weeks. Where can you learn more? Go to http://michelleGauzycharity.info/. eLn Jensen in the search box to learn more about \"Plantar Fasciitis: Care Instructions. \" Current as of: November 29, 2017 Content Version: 11.7 © 7448-3556 Humanco. Care instructions adapted under license by Continuent (which disclaims liability or warranty for this information). If you have questions about a medical condition or this instruction, always ask your healthcare professional. [place] [address] any warranty or liability for your use of this information. Plantar Fasciitis: Exercises Your Care Instructions Here are some examples of typical rehabilitation exercises for your condition. Start each exercise slowly. Ease off the exercise if you start to have pain. Your doctor or physical therapist will tell you when you can start these exercises and which ones will work best for you. How to do the exercises Towel stretch 1. Sit with your legs extended and knees straight. 2. Place a towel around your foot just under the toes. 3. Hold each end of the towel in each hand, with your hands above your knees. 4. Pull back with the towel so that your foot stretches toward you. 5. Hold the position for at least 15 to 30 seconds. 6. Repeat 2 to 4 times a session, up to 5 sessions a day. Calf stretch This exercise stretches the muscles at the back of the lower leg (the calf) and the Achilles tendon. Do this exercise 3 or 4 times a day, 5 days a week. 1. Stand facing a wall with your hands on the wall at about eye level. Put the leg you want to stretch about a step behind your other leg. 2. Keeping your back heel on the floor, bend your front knee until you feel a stretch in the back leg. 3. Hold the stretch for 15 to 30 seconds. Repeat 2 to 4 times. Plantar fascia and calf stretch Stretching the plantar fascia and calf muscles can increase flexibility and decrease heel pain. You can do this exercise several times each day and before and after activity. 1. Stand on a step as shown above. Be sure to hold on to the banister. 2. Slowly let your heels down over the edge of the step as you relax your calf muscles. You should feel a gentle stretch across the bottom of your foot and up the back of your leg to your knee. 3. Hold the stretch about 15 to 30 seconds, and then tighten your calf muscle a little to bring your heel back up to the level of the step. Repeat 2 to 4 times. Towel curls Make this exercise more challenging by placing a weighted object, such as a soup can, on the other end of the towel. 1. While sitting, place your foot on a towel on the floor and scrunch the towel toward you with your toes. 2. Then, also using your toes, push the towel away from you. [place] pickups 1. Put marbles on the floor next to a cup. 
2. Using your toes, try to lift the marbles up from the floor and put them in the cup. Follow-up care is a key part of your treatment and safety. Be sure to make and go to all appointments, and call your doctor if you are having problems. It's also a good idea to know your test results and keep a list of the medicines you take. Where can you learn more? Go to http://michelle-charity.info/.  
Sudheer Silverman in the search box to learn more about \"Plantar Fasciitis: Exercises. \" Current as of: November 29, 2017 Content Version: 11.7 © 7714-6255 Sym[employer] Computing. Care instructions adapted under license by EdeniQ (which disclaims liability or warranty for this information). If you have questions about a medical condition or this instruction, always ask your healthcare professional. [address] any warranty or liability for your use of this information. Hemoglobin A1c: About This Test 
What is it? Hemoglobin A1c is a blood test that checks your average blood sugar level over the past 2 to 3 months. This test also is called a glycohemoglobin test or an A1c test. 
Why is this test done? The A1c test is done to check how well your diabetes has been controlled over the past 2 to 3 months. Your doctor can use this information to adjust your medicine and diabetes treatment, if needed. This test is also one of the tests used to diagnose diabetes in adults. How can you prepare for the test? 
You don't need to stop eating before you have an A1c test. This test can be done at any time during the day, even after a meal. 
What happens during the test? 
The health professional taking a sample of your blood will: · Wrap an elastic band around your upper arm. This makes the veins below the band larger so it is easier to put a needle into the vein. · Clean the needle site with alcohol. · Put the needle into the vein. · Attach a tube to the needle to fill it with blood. · Remove the band from your arm when enough blood is collected. · Put a gauze pad or cotton ball over the needle site as the needle is removed. · Put pressure on the site and then put on a bandage. What else should you know about the test? 
The test result is usually given as a percentage. The normal A1c is less than 5.7%. You have a higher risk for diabetes if your A1c is 5.7% to 6.4%. If your level is 6.5% or higher, you have diabetes. The A1c test result also can be used to find your estimated average glucose, or eAG. Your eAG and A1c show the same thing in two different ways. They both help you learn more about your average blood sugar range over the past 2 to 3 months. A1c is shown as a percentage, while eAG uses the same units (mg/dl) as your glucose meter. Examples: · 6% A1c = 126 mg/dL · 7% A1c = 154 mg/dL · 8% A1c = 183 mg/dL · 9% A1c = 212 mg/dL · 10% A1c = 240 mg/dL · 11% A1c = 269 mg/dL · 12% A1c = 298 mg/dL Where can you learn more? Go to http://michelle-charity.info/. Enter U216 in the search box to learn more about \"Hemoglobin A1c: About This Test.\" Current as of: December 7, 2017 Content Version: 11.7 © 2632-6072 Imperva. Care instructions adapted under license by Novelos Therapeutics (which disclaims liability or warranty for this information). If you have questions about a medical condition or this instruction, always ask your healthcare professional. [place] [address] any warranty or liability for your use of this information. Prediabetes: Care Instructions Your Care Instructions Prediabetes is a warning sign that you are at risk for getting type 2 diabetes. It means that your blood sugar is higher than it should be. The food you eat turns into sugar, which your body uses for energy. Normally, an organ called the pancreas makes insulin, which allows the sugar in your blood to get into your body's cells. But when your body can't use insulin the right way, the sugar doesn't move into cells. It stays in your blood instead. This is called insulin resistance. The buildup of sugar in the blood causes prediabetes. The good news is that lifestyle changes may help you get your blood sugar back to normal and help you avoid or delay diabetes. Follow-up care is a key part of your treatment and safety.  Be sure to make and go to all appointments, and call your doctor if you are having problems. It's also a good idea to know your test results and keep a list of the medicines you take. How can you care for yourself at home? · Watch your weight. A healthy weight helps your body use insulin properly. · Limit the amount of calories, sweets, and unhealthy fat you eat. Ask your doctor if you should see a dietitian. A registered dietitian can help you create meal plans that fit your lifestyle. · Get at least 30 minutes of exercise on most days of the week. Exercise helps control your blood sugar. It also helps you maintain a healthy weight. Walking is a good choice. You also may want to do other activities, such as running, swimming, cycling, or playing tennis or team sports. · Do not smoke. Smoking can make prediabetes worse. If you need help quitting, talk to your doctor about stop-smoking programs and medicines. These can increase your chances of quitting for good. · If your doctor prescribed medicines, take them exactly as prescribed. Call your doctor if you think you are having a problem with your medicine. You will get more details on the specific medicines your doctor prescribes. When should you call for help? Watch closely for changes in your health, and be sure to contact your doctor if: 
  · You have any symptoms of diabetes. These may include: ¨ Being thirsty more often. ¨ Urinating more. ¨ Being hungrier. ¨ Losing weight. ¨ Being very tired. ¨ Having blurry vision.  
  · You have a wound that will not heal.  
  · You have an infection that will not go away.  
  · You have problems with your blood pressure.  
  · You want more information about diabetes and how you can keep from getting it. Where can you learn more? Go to http://michelle-charity.info/. Enter I222 in the search box to learn more about \"Prediabetes: Care Instructions. \" Current as of: December 7, 2017 Content Version: 11.7 © 1490-6020 Drive YOYO. Care instructions adapted under license by Metrigo (which disclaims liability or warranty for this information). If you have questions about a medical condition or this instruction, always ask your healthcare professional. [address] any warranty or liability for your use of this information. Foot Pain: Care Instructions Your Care Instructions Foot injuries that cause pain and swelling are fairly common. Almost all sports or home repair projects can cause a misstep that ends up as foot pain. Normal wear and tear, especially as you get older, also can cause foot pain. Most minor foot injuries will heal on their own, and home treatment is usually all you need to do. If you have a severe injury, you may need tests and treatment. Follow-up care is a key part of your treatment and safety. Be sure to make and go to all appointments, and call your doctor if you are having problems. It's also a good idea to know your test results and keep a list of the medicines you take. How can you care for yourself at home? · Take pain medicines exactly as directed. ¨ If the doctor gave you a prescription medicine for pain, take it as prescribed. ¨ If you are not taking a prescription pain medicine, ask your doctor if you can take an over-the-counter medicine. · Rest and protect your foot. Take a break from any activity that may cause pain. · Put ice or a cold pack on your foot for 10 to 20 minutes at a time. Put a thin cloth between the ice and your skin. · Prop up the sore foot on a pillow when you ice it or anytime you sit or lie down during the next 3 days. Try to keep it above the level of your heart. This will help reduce swelling. · Your doctor may recommend that you wrap your foot with an elastic bandage. Keep your foot wrapped for as long as your doctor advises. · If your doctor recommends crutches, use them as directed. · Wear roomy footwear. · As soon as pain and swelling end, begin gentle exercises of your foot. Your doctor can tell you which exercises will help. When should you call for help? Call 911 anytime you think you may need emergency care. For example, call if: 
  · Your foot turns pale, white, blue, or cold.  
 Call your doctor now or seek immediate medical care if: 
  · You cannot move or stand on your foot.  
  · Your foot looks twisted or out of its normal position.  
  · Your foot is not stable when you step down.  
  · You have signs of infection, such as: 
¨ Increased pain, swelling, warmth, or redness. ¨ Red streaks leading from the sore area. ¨ Pus draining from a place on your foot. ¨ A fever.  
  · Your foot is numb or tingly.  
 Watch closely for changes in your health, and be sure to contact your doctor if: 
  · You do not get better as expected.  
  · You have bruises from an injury that last longer than 2 weeks. Where can you learn more? Go to http://michelle-charity.info/. Enter Z543 in the search box to learn more about \"Foot Pain: Care Instructions. \" Current as of: November 29, 2017 Content Version: 11.7 © 4543-6784 Rootdown. Care instructions adapted under license by Ease My Sell (which disclaims liability or warranty for this information). If you have questions about a medical condition or this instruction, always ask your healthcare professional. [place] [address] any warranty or liability for your use of this information. Introducing [facility]! [employer] introduces SkyJam patient portal. Now you can access parts of your medical record, email your doctor's office, and request medication refills online. 1. In your internet browser, go to [email or web address] 2. Click on the First Time User? Click Here link in the Sign In box. You will see the New Member Sign Up page. 3. Enter your WritePath Access Code exactly as it appears below. You will not need to use this code after youve completed the sign-up process. If you do not sign up before the expiration date, you must request a new code. · WritePath Access Code: [email or web address] Expires: 10/17/2018 10:03 AM 
 
4. Enter the last four digits of your Social Security Number (xxxx) and Date of Birth (mm/dd/yyyy) as indicated and click Submit. You will be taken to the next sign-up page. 5. Create a WritePath ID. This will be your WritePath login ID and cannot be changed, so think of one that is secure and easy to remember. 6. Create a WritePath password. You can change your password at any time. 7. Enter your Password Reset Question and Answer. This can be used at a later time if you forget your password. 8. Enter your e-mail address. You will receive e-mail notification when new information is available in [address]. 9. Click Sign Up. You can now view and download portions of your medical record. 10. Click the Download Summary menu link to download a portable copy of your medical information. If you have questions, please visit the Frequently Asked Questions section of the WritePath website. Remember, WritePath is NOT to be used for urgent needs. For medical emergencies, dial 911. Now available from your iPhone and Android! Please provide this summary of care documentation to your next provider. Your primary care clinician is listed as Grace Holloway. If you have any questions after today's visit, please call [phone].

## 2018-07-19 NOTE — PROGRESS NOTES
Chief Complaint   Patient presents with    Skin Problem     right arm, bump    Foot Pain     L foot

## 2018-07-19 NOTE — PROGRESS NOTES
.    [address] [facility] [address]   Office [phone], Fax [phone]    Subjective:   Kip Moreno is a 34 y.o. female   CC: Left foot pain and weight control  History provided by patient     HPI:    In her 29's her weight was 150-160lbs. In the last two years she is in the 180s and she wants to work on her weight to be healthy. Her \"eating\" is her issue with patient stating the problem of having no appetite. Diet: fruits (watermellon and cantaloupe) and veggies due to the heat. Stop eating fast food years ago. Does not want to eat generally and has not have a \"real meal\" in a long time. 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 Exercise: Pt started consistently walking/exercising since May 2018 and started jogging in June. She wears running sneakers and started having foot pain. She jogs outside with left foot pain at the inner arch of the foot. The pain is achy and burning and 5/10 severity, not constant. Lots of incline. Jogs 4x/week. SIGECAPS: 4/8 positive for sleep irregularities with decreased energy, appetite and feels slow. No suicidal/homicidal ideation. No past medical history on file. Allergies   Allergen Reactions    Latex Hives    Azithromycin Hives    Sulfa (Sulfonamide Antibiotics) Hives     Current Outpatient Prescriptions on File Prior to Visit   Medication Sig Dispense Refill    ALPRAZolam (XANAX) 1 mg tablet Take one tab by mouth the morning of procedure and then 20 minutes prior to procedure 4 Tab 0     No current facility-administered medications on file prior to visit. No family history on file.   Social History     Social History    Marital status:      Spouse name: N/A    Number of children: N/A    Years of education: N/A     Social History Main Topics    Smoking status: Never Smoker    Smokeless tobacco: Not on file    Alcohol use No    Drug use: No    Sexual activity: Yes     Partners: Female     Birth control/ protection: Surgical     Other Topics Concern    Not on file     Social History Narrative     Past Surgical History:   Procedure Laterality Date    HX TUBAL LIGATION  2005       There is no problem list on file for this patient. Review of Systems   Constitutional: Negative for chills and fever. HENT: Negative for sore throat. Eyes: Negative for pain. Respiratory: Negative for cough. Gastrointestinal: Negative for abdominal pain. Psychiatric/Behavioral: Negative for depression and suicidal ideas. Objective:     Visit Vitals    /77    Pulse 84    Temp 97.5 °F (36.4 °C) (Oral)    Resp 16    Ht 5' 3.75\" (1.619 m)    Wt 188 lb (85.3 kg)    LMP 07/19/2018    SpO2 99%    BMI 32.52 kg/m2        Physical Exam   Constitutional: She appears well-developed and well-nourished. No distress. HENT:   Head: Normocephalic and atraumatic. Eyes: Pupils are equal, round, and reactive to light. Neck: Neck supple. No tracheal deviation present. No thyromegaly present. Cardiovascular: Normal rate, regular rhythm and normal heart sounds. Pulmonary/Chest: Effort normal and breath sounds normal. No respiratory distress. Abdominal: Soft. She exhibits no distension. There is no tenderness. Musculoskeletal:        Left ankle: She exhibits normal range of motion, no swelling and no ecchymosis. Achilles tendon exhibits no pain. Feet:    Tender at insertion point of the plantar fascia attach to calcaneus. Skin: She is not diaphoretic. Psychiatric: She has a normal mood and affect. Her behavior is normal. Thought content normal. She expresses no homicidal and no suicidal ideation. Pertinent Labs/Studies: None. Assessment and orders:     Gilberto Kennedy is a 34 y.o. OTHER female presents with left foot pain and weight gain. No significant medical history. ICD-10-CM ICD-9-CM    1. Weight gain R63.5 783.1 TSH REFLEX TO T4      CBC W/O DIFF   2. Prediabetes F52.55 888.67 METABOLIC PANEL, BASIC      HEMOGLOBIN A1C W/O EAG      LIPID PANEL      CBC W/O DIFF   3. Screening for STDs (sexually transmitted diseases) Z11.3 V74.5 HEPATITIS PANEL, ACUTE      HIV 1/2 AG/AB, 4TH GENERATION,W RFLX CONFIRM   4. Left foot pain M79.672 729.5      Left foot pain, presents with pain especially after recent start of exercise regimen. She has tenderness at the heel where the insertion point of the plantar fascia ligament. She has FROM, 5/5 strength, and can put pressure on the foot. Most likely plantar fasciitis,     -NSAIDs: Take ibuprofen as needed for pain and inflammation  -heel cup insert to help with pain  -Follow up: PRN for foot   -Xray not indicated at this time and patient did not feel the need for XR      Prediabetes  -Referral to Dietician for nutrition counseling  -Last A1c was 5.9, recheck today  -Lipid panel today  -BMP, check renal function  -CBC w/o diff     Weight gain  -No appetite most days, especially in the summer. Has been going on for years. -TSH check  -Does not meet criteria for MDD    STD screen  -sexually active  -requires screening for work  -acute hepatitis panel  -HIV    I have reviewed patient medical and social history and medications. I have reviewed pertinent labs results and other data. I have discussed the diagnosis with the patient and the intended plan as seen in the above orders. The patient has received an after-visit summary and questions were answered concerning future plans. I have discussed medication side effects and warnings with the patient as well.     Patient discussed and seen with Dr. Meseret Ellsworth, Attending Physician    Jayde Sam MD  [address] [place]  Medicine Resident  07/19/18

## 2018-07-19 NOTE — PATIENT INSTRUCTIONS
NSAIDS: Advil, ibuprofen  HEEL CUP    _________________________________     Plantar Fasciitis: Care Instructions  Your Care Instructions    Plantar fasciitis is pain and inflammation of the plantar fascia, the tissue at the bottom of your foot that connects the heel bone to the toes. The plantar fascia also supports the arch. If you strain the plantar fascia, it can develop small tears and cause heel pain when you stand or walk. Plantar fasciitis can be caused by running or other sports. It also may occur in people who are overweight or who have high arches or flat feet. You may get plantar fasciitis if you walk or stand for long periods, or have a tight Achilles tendon or calf muscles. You can improve your foot pain with rest and other care at home. It might take a few weeks to a few months for your foot to heal completely. Follow-up care is a key part of your treatment and safety. Be sure to make and go to all appointments, and call your doctor if you are having problems. It's also a good idea to know your test results and keep a list of the medicines you take. How can you care for yourself at home? · Rest your feet often. Reduce your activity to a level that lets you avoid pain. If possible, do not run or walk on hard surfaces. · Take pain medicines exactly as directed. ¨ If the doctor gave you a prescription medicine for pain, take it as prescribed. ¨ If you are not taking a prescription pain medicine, take an over-the-counter anti-inflammatory medicine for pain and swelling, such as ibuprofen (Advil, Motrin) or naproxen (Aleve). Read and follow all instructions on the label. · Use ice massage to help with pain and swelling. You can use an ice cube or an ice cup several times a day. To make an ice cup, fill a paper cup with water and freeze it. Cut off the top of the cup until a half-inch of ice shows. Hold onto the remaining paper to use the cup.  Rub the ice in small circles over the area for 5 to 7 minutes. · Contrast baths, which alternate hot and cold water, can also help reduce swelling. But because heat alone may make pain and swelling worse, end a contrast bath with a soak in cold water. · Wear a night splint if your doctor suggests it. A night splint holds your foot with the toes pointed up and the foot and ankle at a 90-degree angle. This position gives the bottom of your foot a constant, gentle stretch. · Do simple exercises such as calf stretches and towel stretches 2 to 3 times each day, especially when you first get up in the morning. These can help the plantar fascia become more flexible. They also make the muscles that support your arch stronger. Hold these stretches for 15 to 30 seconds per stretch. Repeat 2 to 4 times. ¨ Stand about 1 foot from a wall. Place the palms of both hands against the wall at chest level. Lean forward against the wall, keeping one leg with the knee straight and heel on the ground while bending the knee of the other leg. ¨ Sit down on the floor or a mat with your feet stretched in front of you. Roll up a towel lengthwise, and loop it over the ball of your foot. Holding the towel at both ends, gently pull the towel toward you to stretch your foot. · Wear shoes with good arch support. Athletic shoes or shoes with a well-cushioned sole are good choices. · Try heel cups or shoe inserts (orthotics) to help cushion your heel. You can buy these at many shoe stores. · Put on your shoes as soon as you get out of bed. Going barefoot or wearing slippers may make your pain worse. · Reach and stay at a good weight for your height. This puts less strain on your feet. When should you call for help?   Call your doctor now or seek immediate medical care if:    · You have heel pain with fever, redness, or warmth in your heel.     · You cannot put weight on the sore foot.    Watch closely for changes in your health, and be sure to contact your doctor if:    · You have numbness or tingling in your heel.     · Your heel pain lasts more than 2 weeks. Where can you learn more? Go to http://michelle-charity.info/. Tracie Blanco in the search box to learn more about \"Plantar Fasciitis: Care Instructions. \"  Current as of: November 29, 2017  Content Version: 11.7  © 0480-3487 [employer]. Care instructions adapted under license by [employer] (which disclaims liability or warranty for this information). If you have questions about a medical condition or this instruction, always ask your healthcare professional. [place] [address] any warranty or liability for your use of this information. Plantar Fasciitis: Exercises  Your Care Instructions  Here are some examples of typical rehabilitation exercises for your condition. Start each exercise slowly. Ease off the exercise if you start to have pain. Your doctor or physical therapist will tell you when you can start these exercises and which ones will work best for you. How to do the exercises  Towel stretch    1. Sit with your legs extended and knees straight. 2. Place a towel around your foot just under the toes. 3. Hold each end of the towel in each hand, with your hands above your knees. 4. Pull back with the towel so that your foot stretches toward you. 5. Hold the position for at least 15 to 30 seconds. 6. Repeat 2 to 4 times a session, up to 5 sessions a day. Calf stretch    This exercise stretches the muscles at the back of the lower leg (the calf) and the Achilles tendon. Do this exercise 3 or 4 times a day, 5 days a week. 1. Stand facing a wall with your hands on the wall at about eye level. Put the leg you want to stretch about a step behind your other leg. 2. Keeping your back heel on the floor, bend your front knee until you feel a stretch in the back leg. 3. Hold the stretch for 15 to 30 seconds. Repeat 2 to 4 times.   Plantar fascia and calf stretch    Stretching the plantar fascia and calf muscles can increase flexibility and decrease heel pain. You can do this exercise several times each day and before and after activity. 1. Stand on a step as shown above. Be sure to hold on to the banister. 2. Slowly let your heels down over the edge of the step as you relax your calf muscles. You should feel a gentle stretch across the bottom of your foot and up the back of your leg to your knee. 3. Hold the stretch about 15 to 30 seconds, and then tighten your calf muscle a little to bring your heel back up to the level of the step. Repeat 2 to 4 times. Towel curls    Make this exercise more challenging by placing a weighted object, such as a soup can, on the other end of the towel. 1. While sitting, place your foot on a towel on the floor and scrunch the towel toward you with your toes. 2. Then, also using your toes, push the towel away from you. [place] pickups    1. Put marbles on the floor next to a cup.  2. Using your toes, try to lift the marbles up from the floor and put them in the cup. Follow-up care is a key part of your treatment and safety. Be sure to make and go to all appointments, and call your doctor if you are having problems. It's also a good idea to know your test results and keep a list of the medicines you take. Where can you learn more? Go to http://michelle-charity.info/. Anette Yuan in the search box to learn more about \"Plantar Fasciitis: Exercises. \"  Current as of: November 29, 2017  Content Version: 11.7  © 1273-0620 [employer]. Care instructions adapted under license by [employer] (which disclaims liability or warranty for this information). If you have questions about a medical condition or this instruction, always ask your healthcare professional. [address] any warranty or liability for your use of this information. Hemoglobin A1c: About This Test  What is it?     Hemoglobin A1c is a blood test that checks your average blood sugar level over the past 2 to 3 months. This test also is called a glycohemoglobin test or an A1c test.  Why is this test done? The A1c test is done to check how well your diabetes has been controlled over the past 2 to 3 months. Your doctor can use this information to adjust your medicine and diabetes treatment, if needed. This test is also one of the tests used to diagnose diabetes in adults. How can you prepare for the test?  You don't need to stop eating before you have an A1c test. This test can be done at any time during the day, even after a meal.  What happens during the test?  The health professional taking a sample of your blood will:  · Wrap an elastic band around your upper arm. This makes the veins below the band larger so it is easier to put a needle into the vein. · Clean the needle site with alcohol. · Put the needle into the vein. · Attach a tube to the needle to fill it with blood. · Remove the band from your arm when enough blood is collected. · Put a gauze pad or cotton ball over the needle site as the needle is removed. · Put pressure on the site and then put on a bandage. What else should you know about the test?  The test result is usually given as a percentage. The normal A1c is less than 5.7%. You have a higher risk for diabetes if your A1c is 5.7% to 6.4%. If your level is 6.5% or higher, you have diabetes. The A1c test result also can be used to find your estimated average glucose, or eAG. Your eAG and A1c show the same thing in two different ways. They both help you learn more about your average blood sugar range over the past 2 to 3 months. A1c is shown as a percentage, while eAG uses the same units (mg/dl) as your glucose meter. Examples:  · 6% A1c = 126 mg/dL  · 7% A1c = 154 mg/dL  · 8% A1c = 183 mg/dL  · 9% A1c = 212 mg/dL  · 10% A1c = 240 mg/dL  · 11% A1c = 269 mg/dL  · 12% A1c = 298 mg/dL  Where can you learn more?   Go to http://michelle-charity.info/. Enter U216 in the search box to learn more about \"Hemoglobin A1c: About This Test.\"  Current as of: December 7, 2017  Content Version: 11.7  © 2006-2018 [employer]. Care instructions adapted under license by [employer] (which disclaims liability or warranty for this information). If you have questions about a medical condition or this instruction, always ask your healthcare professional. [place] [address] any warranty or liability for your use of this information. Prediabetes: Care Instructions  Your Care Instructions    Prediabetes is a warning sign that you are at risk for getting type 2 diabetes. It means that your blood sugar is higher than it should be. The food you eat turns into sugar, which your body uses for energy. Normally, an organ called the pancreas makes insulin, which allows the sugar in your blood to get into your body's cells. But when your body can't use insulin the right way, the sugar doesn't move into cells. It stays in your blood instead. This is called insulin resistance. The buildup of sugar in the blood causes prediabetes. The good news is that lifestyle changes may help you get your blood sugar back to normal and help you avoid or delay diabetes. Follow-up care is a key part of your treatment and safety. Be sure to make and go to all appointments, and call your doctor if you are having problems. It's also a good idea to know your test results and keep a list of the medicines you take. How can you care for yourself at home? · Watch your weight. A healthy weight helps your body use insulin properly. · Limit the amount of calories, sweets, and unhealthy fat you eat. Ask your doctor if you should see a dietitian. A registered dietitian can help you create meal plans that fit your lifestyle. · Get at least 30 minutes of exercise on most days of the week. Exercise helps control your blood sugar. It also helps you maintain a healthy weight. Walking is a good choice. You also may want to do other activities, such as running, swimming, cycling, or playing tennis or team sports. · Do not smoke. Smoking can make prediabetes worse. If you need help quitting, talk to your doctor about stop-smoking programs and medicines. These can increase your chances of quitting for good. · If your doctor prescribed medicines, take them exactly as prescribed. Call your doctor if you think you are having a problem with your medicine. You will get more details on the specific medicines your doctor prescribes. When should you call for help? Watch closely for changes in your health, and be sure to contact your doctor if:    · You have any symptoms of diabetes. These may include:  ¨ Being thirsty more often. ¨ Urinating more. ¨ Being hungrier. ¨ Losing weight. ¨ Being very tired. ¨ Having blurry vision.     · You have a wound that will not heal.     · You have an infection that will not go away.     · You have problems with your blood pressure.     · You want more information about diabetes and how you can keep from getting it. Where can you learn more? Go to http://michelleCorona Labscharity.info/. Enter I222 in the search box to learn more about \"Prediabetes: Care Instructions. \"  Current as of: December 7, 2017  Content Version: 11.7  © 6857-5518 [employer] Care instructions adapted under license by [employer] (which disclaims liability or warranty for this information). If you have questions about a medical condition or this instruction, always ask your healthcare professional. [place] [address] any warranty or liability for your use of this information. Foot Pain: Care Instructions  Your Care Instructions  Foot injuries that cause pain and swelling are fairly common. Almost all sports or home repair projects can cause a misstep that ends up as foot pain.  Normal wear and tear, especially as you get older, also can cause foot pain. Most minor foot injuries will heal on their own, and home treatment is usually all you need to do. If you have a severe injury, you may need tests and treatment. Follow-up care is a key part of your treatment and safety. Be sure to make and go to all appointments, and call your doctor if you are having problems. It's also a good idea to know your test results and keep a list of the medicines you take. How can you care for yourself at home? · Take pain medicines exactly as directed. ¨ If the doctor gave you a prescription medicine for pain, take it as prescribed. ¨ If you are not taking a prescription pain medicine, ask your doctor if you can take an over-the-counter medicine. · Rest and protect your foot. Take a break from any activity that may cause pain. · Put ice or a cold pack on your foot for 10 to 20 minutes at a time. Put a thin cloth between the ice and your skin. · Prop up the sore foot on a pillow when you ice it or anytime you sit or lie down during the next 3 days. Try to keep it above the level of your heart. This will help reduce swelling. · Your doctor may recommend that you wrap your foot with an elastic bandage. Keep your foot wrapped for as long as your doctor advises. · If your doctor recommends crutches, use them as directed. · Wear roomy footwear. · As soon as pain and swelling end, begin gentle exercises of your foot. Your doctor can tell you which exercises will help. When should you call for help? Call 911 anytime you think you may need emergency care.  For example, call if:    · Your foot turns pale, white, blue, or cold.    Call your doctor now or seek immediate medical care if:    · You cannot move or stand on your foot.     · Your foot looks twisted or out of its normal position.     · Your foot is not stable when you step down.     · You have signs of infection, such as:  ¨ Increased pain, swelling, warmth, or redness. ¨ Red streaks leading from the sore area. ¨ Pus draining from a place on your foot. ¨ A fever.     · Your foot is numb or tingly.    Watch closely for changes in your health, and be sure to contact your doctor if:    · You do not get better as expected.     · You have bruises from an injury that last longer than 2 weeks. Where can you learn more? Go to http://michelle-charity.info/. Enter R241 in the search box to learn more about \"Foot Pain: Care Instructions. \"  Current as of: November 29, 2017  Content Version: 11.7  © 9676-1629 [employer]. Care instructions adapted under license by [employer] (which disclaims liability or warranty for this information). If you have questions about a medical condition or this instruction, always ask your healthcare professional. [address] any warranty or liability for your use of this information.

## 2018-07-20 ENCOUNTER — TELEPHONE (OUTPATIENT)
Dept: FAMILY MEDICINE CLINIC | Age: 38
End: 2018-07-20

## 2018-07-20 LAB
BUN SERPL-MCNC: 9 MG/DL (ref 6–24)
BUN/CREAT SERPL: 9 (ref 9–23)
CALCIUM SERPL-MCNC: 9.7 MG/DL (ref 8.7–10.2)
CHLORIDE SERPL-SCNC: 104 MMOL/L (ref 96–106)
CHOLEST SERPL-MCNC: 225 MG/DL (ref 100–199)
CO2 SERPL-SCNC: 21 MMOL/L (ref 20–29)
CREAT SERPL-MCNC: 1.04 MG/DL (ref 0.57–1)
ERYTHROCYTE [DISTWIDTH] IN BLOOD BY AUTOMATED COUNT: 13.1 % (ref 12.3–15.4)
GLUCOSE SERPL-MCNC: 87 MG/DL (ref 65–99)
HAV IGM SERPL QL IA: NEGATIVE
HBA1C MFR BLD: 5.2 % (ref 4.8–5.6)
HBV CORE IGM SERPL QL IA: NEGATIVE
HBV SURFACE AG SERPL QL IA: NEGATIVE
HCT VFR BLD AUTO: 42.8 % (ref 34–46.6)
HCV AB S/CO SERPL IA: <0.1 S/CO RATIO (ref 0–0.9)
HDLC SERPL-MCNC: 78 MG/DL
HGB BLD-MCNC: 13.5 G/DL (ref 11.1–15.9)
HIV 1+2 AB+HIV1 P24 AG SERPL QL IA: NON REACTIVE
INTERPRETATION, 910389: NORMAL
LDLC SERPL CALC-MCNC: 119 MG/DL (ref 0–99)
MCH RBC QN AUTO: 28.7 PG (ref 26.6–33)
MCHC RBC AUTO-ENTMCNC: 31.5 G/DL (ref 31.5–35.7)
MCV RBC AUTO: 91 FL (ref 79–97)
PLATELET # BLD AUTO: 304 X10E3/UL (ref 150–379)
POTASSIUM SERPL-SCNC: 4.3 MMOL/L (ref 3.5–5.2)
RBC # BLD AUTO: 4.71 X10E6/UL (ref 3.77–5.28)
SODIUM SERPL-SCNC: 142 MMOL/L (ref 134–144)
TRIGL SERPL-MCNC: 140 MG/DL (ref 0–149)
TSH SERPL DL<=0.005 MIU/L-ACNC: 3.24 UIU/ML (ref 0.45–4.5)
VLDLC SERPL CALC-MCNC: 28 MG/DL (ref 5–40)
WBC # BLD AUTO: 8.7 X10E3/UL (ref 3.4–10.8)

## 2018-07-20 NOTE — PROGRESS NOTES
I have reviewed all the lab results.  -Acute hep panel: Hep A, B,C negative  -HIV NR  -CBC,BMP, and TSH: WNL  -HgA1c improved from 5.9 to 5.2  -Lipid panel: Total cholesterol is elevated at 225. LDL (bad cholesterol) is elevated at 119. Currently 0.1% 10 year ASCVD risk. Will call patient with results.

## 2018-07-23 ENCOUNTER — HOSPITAL ENCOUNTER (OUTPATIENT)
Dept: LAB | Age: 38
Discharge: HOME | End: 2018-07-23
Payer: COMMERCIAL

## 2018-07-23 ENCOUNTER — OFFICE VISIT (OUTPATIENT)
Dept: FAMILY MEDICINE CLINIC | Age: 38
End: 2018-07-23

## 2018-07-23 VITALS
DIASTOLIC BLOOD PRESSURE: 77 MMHG | HEART RATE: 83 BPM | BODY MASS INDEX: 32.61 KG/M2 | TEMPERATURE: 98 F | RESPIRATION RATE: 11 BRPM | HEIGHT: 64 IN | SYSTOLIC BLOOD PRESSURE: 115 MMHG | WEIGHT: 191 LBS

## 2018-07-23 DIAGNOSIS — R87.610 ATYPICAL SQUAMOUS CELLS OF UNDETERMINED SIGNIFICANCE ON CYTOLOGIC SMEAR OF CERVIX (ASC-US): Primary | ICD-10-CM

## 2018-07-23 DIAGNOSIS — Z01.419 WELL WOMAN EXAM WITH ROUTINE GYNECOLOGICAL EXAM: ICD-10-CM

## 2018-07-23 PROCEDURE — 87625 HPV TYPES 16 & 18 ONLY: CPT | Performed by: FAMILY MEDICINE

## 2018-07-23 PROCEDURE — 87624 HPV HI-RISK TYP POOLED RSLT: CPT | Performed by: FAMILY MEDICINE

## 2018-07-23 PROCEDURE — 88175 CYTOPATH C/V AUTO FLUID REDO: CPT | Performed by: FAMILY MEDICINE

## 2018-07-23 NOTE — PATIENT INSTRUCTIONS
Learning About Cervical Cancer Screening  What is a cervical cancer screening test?    Cervical cancer screening tests check for cancer of the cervix. The cervix is the lower part of the uterus that opens into the vagina. The test can help your doctor find early changes in the cells that could lead to cancer. Two tests can be used to screen for cervical cancer. They may be used alone or together. A Pap test.   This test looks for changes in the cells of the cervix. Abnormal cells may be a sign of cancer. A human papillomavirus (HPV) test.   This test looks for the HPV virus. Some types of HPV can cause cancer. During either test, the doctor or nurse will insert a tool called a speculum into your vagina. The speculum gently spreads apart the vaginal walls. It allows your doctor to see inside the vagina and the cervix. He or she uses a small swab or brush to collect cell samples from your cervix. Try to schedule the test when you're not having your period. To get ready for the test, avoid douches, tampons, vaginal medicines, sprays, and powders for at least a day before you have the test.  When should you have a screening test?  These guidelines apply to women who are at average risk of cervical cancer. If you don't know your risk, talk with your doctor. Women 21 to 34  · You can start having a Pap test at age 24. It is done every 3 years. · You can start having the primary HPV test at age 22. It is done every 3 years. Women 30 to 59  · If you had both a Pap test and an HPV test last time and both were normal, you can have a Pap plus an HPV test every 5 years. · If you had only a Pap test last time, as long as your results are normal, you can have a Pap test every 3 years. · If you had only an HPV test last time, as long as your results are normal, you can have an HPV test every 3 years. Women 72 and older  · If you are age 72 or older, talk with your doctor about what's right for you.  Women ages 72 and older may no longer need to be screened for cervical cancer. When to stop having screening tests depends on your medical history, your overall health, and your risk of cervical cell changes or cervical cancer. What happens after the test?  The sample of cells taken during your test will be sent to a lab so that an expert can look at the cells. It usually takes a week or two to get the results back. Pap tests  · A normal result means that the test didn't find any abnormal cells in the sample. · An abnormal result can mean many things. Most of these aren't cancer. The results of your test may be abnormal because:  ¨ You have an infection of the vagina or cervix, such as a yeast infection. ¨ You have an IUD (intrauterine device for birth control). ¨ You have low estrogen levels after menopause that are causing the cells to change. ¨ You have cell changes that may be a sign of precancer or cancer. The results are ranked based on how serious the changes might be. HPV tests  · A normal result means that the test didn't find any high-risk HPV in the sample. · An abnormal HPV test doesn't mean that you have cervical cancer. It may mean that you are infected with one or more high-risk types of HPV. This increases your chance of having cell changes that may be a sign of precancer or cancer. Follow-up care is a key part of your treatment and safety. Be sure to make and go to all appointments, and call your doctor if you are having problems. It's also a good idea to know your test results and keep a list of the medicines you take. Where can you learn more? Go to http://michelle-charity.info/. Enter P919 in the search box to learn more about \"Learning About Cervical Cancer Screening. \"  Current as of: January 31, 2018  Content Version: 11.7  © 0410-0660 [employer].  Care instructions adapted under license by [employer] (which disclaims liability or warranty for this information). If you have questions about a medical condition or this instruction, always ask your healthcare professional. [place] [address] any warranty or liability for your use of this information.

## 2018-07-23 NOTE — PROGRESS NOTES
HPI       Chief Complaint: Pap     Cecil Fan is a 34 y.o. female who presents for Pap. Was just seen  for annual exam but was on menses at that time    , tubal ligation in   - Pap 2017 ASCUS, HPV+ (all previous Paps were normal)  - 2017 Colposcopy benign, recommended repeat co-testing in 1 year --> this is her one year follow up    - Sexually active but has not been sexually active in >1 year  - Has never had an STD, recently checked for HIV and hepatitis (negative). - Would like chlamydia/gonorrhea checked today  - No vaginal discharge, vaginal itching, or any urinary complaints. PMHx:  No past medical history on file. Meds:   Current Outpatient Prescriptions   Medication Sig Dispense Refill    ALPRAZolam (XANAX) 1 mg tablet Take one tab by mouth the morning of procedure and then 20 minutes prior to procedure 4 Tab 0     Allergies: Allergies   Allergen Reactions    Latex Hives    Azithromycin Hives    Sulfa (Sulfonamide Antibiotics) Hives       Smoker:  History   Smoking Status    Never Smoker   Smokeless Tobacco    Not on file     ETOH:   History   Alcohol Use No     FH:   No family history on file. ROS  Review of Systems   Constitutional: Negative for chills, fever and weight loss. HENT: Negative for congestion, sinus pain and sore throat. Eyes: Negative for blurred vision and double vision. Cardiovascular: Negative for chest pain and palpitations. Gastrointestinal: Negative for abdominal pain, constipation, diarrhea, nausea and vomiting. Genitourinary: Negative for dysuria, frequency and urgency. Neurological: Negative for dizziness, weakness and headaches.        Physical Exam:  Visit Vitals    /77    Pulse 83    Temp 98 °F (36.7 °C) (Oral)    Resp 11    Ht 5' 3.75\" (1.619 m)    Wt 191 lb (86.6 kg)    LMP 2018    BMI 33.04 kg/m2       Wt Readings from Last 3 Encounters:   18 191 lb (86.6 kg)   18 188 lb (85.3 kg) 07/05/17 184 lb (83.5 kg)     BP Readings from Last 3 Encounters:   07/23/18 115/77   07/19/18 110/77   07/05/17 109/74      Physical Exam   Constitutional: She is oriented to person, place, and time. She appears well-developed and well-nourished. Eyes: EOM are normal.   Neck: No thyromegaly present. Cardiovascular: Normal rate and regular rhythm. No murmur heard. Pulmonary/Chest: Effort normal and breath sounds normal. No respiratory distress. She has no wheezes. She has no rales. Abdominal: Soft. Bowel sounds are normal. She exhibits no distension. There is no tenderness. Genitourinary: Vagina normal and uterus normal. No vaginal discharge found. Neurological: She is alert and oriented to person, place, and time. Skin: Skin is warm and dry. Vitals reviewed. I personally reviewed the following lab results:   No results found for this or any previous visit (from the past 12 hour(s)). Assessment     34 y.o. female with:    ICD-10-CM ICD-9-CM    1. Atypical squamous cells of undetermined significance on cytologic smear of cervix (ASC-US) R87.610 795.01 CA SCREEN;PELVIC/BREAST EXAM   2. Well woman exam with routine gynecological exam Z01.419 V72.31 PAP IG, APTIMA HPV AND RFX 16/18,45 ([id number])      CHLAMYDIA/GC PCR      CA SCREEN;PELVIC/BREAST EXAM              Plan       Orders Placed This Encounter    CHLAMYDIA/GC PCR    CA SCREEN;PELVIC/BREAST EXAM    PAP IG, APTIMA HPV AND RFX 16/18,45 ([id number])     Well Woman Exam  - Pap, gc/chlamydia done today after ASCUS Pap with HPV+ and negative colposcopy in 2017  - Health Maintenance - due for Tdap, patient would like to wait to do that. - Would like results of Pap and gc/chlamydia called to her. [phone]. Is personal cell phone, says is ok to leave information on it as she is the only person with access to it.      Patient discussed with Dr. Mary Walker    I have discussed the diagnosis with the patient and the intended plan as seen in the above orders. The patient has received an after-visit summary and questions were answered concerning future plans. I have discussed medication side effects and warnings with the patient as well.     Edward Casey MD  Family Medicine Resident  PGY-2

## 2018-07-23 NOTE — MR AVS SNAPSHOT
[address] 
[phone] Patient: Renate Vera MRN: [id number] GYX:[id number] Visit Information Date & Time Provider Department Dept. Phone Encounter #  
 7/23/2018  3:45 PM Sy Koch, [address] [phone] [id number] Upcoming Health Maintenance Date Due DTaP/Tdap/Td series (1 - Tdap) 6/11/1999 Influenza Age 5 to Adult 8/1/2018 PAP AKA CERVICAL CYTOLOGY 5/15/2020 Allergies as of 7/23/2018  Review Complete On: 7/19/2018 By: Collin Villa LPN Severity Noted Reaction Type Reactions Latex  05/15/2017    Hives Azithromycin  05/15/2017    Hives Sulfa (Sulfonamide Antibiotics)  05/15/2017    Hives Current Immunizations  Reviewed on 7/23/2018 No immunizations on file. Reviewed by Sy Koch MD on 7/23/2018 at  4:21 PM  
You Were Diagnosed With   
  
 Codes Comments Well woman exam with routine gynecological exam    -  Primary ICD-10-CM: V14.573 ICD-9-CM: V72.31 Vitals BP Pulse Temp Resp Height(growth percentile) Weight(growth percentile) 115/77 83 98 °F (36.7 °C) (Oral) 11 5' 3.75\" (1.619 m) 191 lb (86.6 kg) LMP BMI OB Status Smoking Status 07/19/2018 33.04 kg/m2 Having regular periods Never Smoker BMI and BSA Data Body Mass Index Body Surface Area 33.04 kg/m 2 1.97 m 2 Preferred Pharmacy Pharmacy Name Phone [address], [address] [place] [address] [address] [facility] [address] [phone] Your Updated Medication List  
  
   
This list is accurate as of 7/23/18  4:22 PM.  Always use your most recent med list.  
  
  
  
  
 ALPRAZolam 1 mg tablet Commonly known as:  Leward Lennox Take one tab by mouth the morning of procedure and then 20 minutes prior to procedure We Performed the Following CHLAMYDIA/GC PCR [39465 CPT(R)] PAP IG, APTIMA HPV AND RFX 16/18,45 ([id number]) [[id number] Custom] Patient Instructions Learning About Cervical Cancer Screening What is a cervical cancer screening test? 
 
Cervical cancer screening tests check for cancer of the cervix. The cervix is the lower part of the uterus that opens into the vagina. The test can help your doctor find early changes in the cells that could lead to cancer. Two tests can be used to screen for cervical cancer. They may be used alone or together. A Pap test.  
This test looks for changes in the cells of the cervix. Abnormal cells may be a sign of cancer. A human papillomavirus (HPV) test.  
This test looks for the HPV virus. Some types of HPV can cause cancer. During either test, the doctor or nurse will insert a tool called a speculum into your vagina. The speculum gently spreads apart the vaginal walls. It allows your doctor to see inside the vagina and the cervix. He or she uses a small swab or brush to collect cell samples from your cervix. Try to schedule the test when you're not having your period. To get ready for the test, avoid douches, tampons, vaginal medicines, sprays, and powders for at least a day before you have the test. 
When should you have a screening test? 
These guidelines apply to women who are at average risk of cervical cancer. If you don't know your risk, talk with your doctor. Women 21 to 34 
· You can start having a Pap test at age 24. It is done every 3 years. · You can start having the primary HPV test at age 22. It is done every 3 years. Women 30 to 59 · If you had both a Pap test and an HPV test last time and both were normal, you can have a Pap plus an HPV test every 5 years. · If you had only a Pap test last time, as long as your results are normal, you can have a Pap test every 3 years. · If you had only an HPV test last time, as long as your results are normal, you can have an HPV test every 3 years. Women 72 and older · If you are age 72 or older, talk with your doctor about what's right for you. Women ages 72 and older may no longer need to be screened for cervical cancer. When to stop having screening tests depends on your medical history, your overall health, and your risk of cervical cell changes or cervical cancer. What happens after the test? 
The sample of cells taken during your test will be sent to a lab so that an expert can look at the cells. It usually takes a week or two to get the results back. Pap tests · A normal result means that the test didn't find any abnormal cells in the sample. · An abnormal result can mean many things. Most of these aren't cancer. The results of your test may be abnormal because: 
¨ You have an infection of the vagina or cervix, such as a yeast infection. ¨ You have an IUD (intrauterine device for birth control). ¨ You have low estrogen levels after menopause that are causing the cells to change. ¨ You have cell changes that may be a sign of precancer or cancer. The results are ranked based on how serious the changes might be. HPV tests · A normal result means that the test didn't find any high-risk HPV in the sample. · An abnormal HPV test doesn't mean that you have cervical cancer. It may mean that you are infected with one or more high-risk types of HPV. This increases your chance of having cell changes that may be a sign of precancer or cancer. Follow-up care is a key part of your treatment and safety. Be sure to make and go to all appointments, and call your doctor if you are having problems. It's also a good idea to know your test results and keep a list of the medicines you take. Where can you learn more? Go to http://michelle-charity.info/. Enter P919 in the search box to learn more about \"Learning About Cervical Cancer Screening. \" Current as of: January 31, 2018 Content Version: 11.7 © 3236-1529 Healthwise, Incorporated. Care instructions adapted under license by [employer] (which disclaims liability or warranty for this information). If you have questions about a medical condition or this instruction, always ask your healthcare professional. [address] any warranty or liability for your use of this information. Introducing [facility]! [facility] introduces [employer] patient portal. Now you can access parts of your medical record, email your doctor's office, and request medication refills online. 1. In your internet browser, go to [email or web address] 2. Click on the First Time User? Click Here link in the Sign In box. You will see the New Member Sign Up page. 3. Enter your [employer] Access Code exactly as it appears below. You will not need to use this code after youve completed the sign-up process. If you do not sign up before the expiration date, you must request a new code. · [employer] Access Code: [email or web address] Expires: 10/17/2018 10:03 AM 
 
4. Enter the last four digits of your Social Security Number (xxxx) and Date of Birth (mm/dd/yyyy) as indicated and click Submit. You will be taken to the next sign-up page. 5. Create a [employer] ID. This will be your [employer] login ID and cannot be changed, so think of one that is secure and easy to remember. 6. Create a [employer] password. You can change your password at any time. 7. Enter your Password Reset Question and Answer. This can be used at a later time if you forget your password. 8. Enter your e-mail address. You will receive e-mail notification when new information is available in [address] 9. Click Sign Up. You can now view and download portions of your medical record. 10. Click the Download Summary menu link to download a portable copy of your medical information.  
 
If you have questions, please visit the Frequently Asked Questions section of the [employer]. Remember, [email or web address] is NOT to be used for urgent needs. For medical emergencies, dial 911. Now available from your iPhone and Android! Please provide this summary of care documentation to your next provider. Your primary care clinician is listed as Grace Holloway. If you have any questions after today's visit, please call [phone].

## 2018-07-24 ENCOUNTER — TELEPHONE (OUTPATIENT)
Dept: FAMILY MEDICINE CLINIC | Age: 38
End: 2018-07-24

## 2018-07-24 NOTE — TELEPHONE ENCOUNTER
Called patient on Friday twice and today twice on Mobile [phone] and Home [phone] and Today at Mobile and Home phone: [phone]. No one answered phone calls.

## 2018-07-26 LAB
C TRACH RRNA SPEC QL NAA+PROBE: NEGATIVE
N GONORRHOEA RRNA SPEC QL NAA+PROBE: NEGATIVE

## 2018-07-30 ENCOUNTER — TELEPHONE (OUTPATIENT)
Dept: FAMILY MEDICINE CLINIC | Age: 38
End: 2018-07-30

## 2018-07-30 NOTE — LETTER
7/30/2018 1:51 PM 
 
Ms. Blackburn Erm [address] [address] Ms Karon Saver, Your Pap results show that you need another repeat colposcopy. The dates that I am in clinic and able to do this procedure are the following afternoons, all in August: 
 
Tues 7th PM 
Fri 17th PM 
Tues 21st PM 
Fri 24th PM 
Tues 28th PM 
Friday 31st PM 
 
I tried calling you earlier but it went to voicemail, I will try calling later, or you can reply to me via Web and Rankt. Sincerely, Samuel rPice MD

## 2018-07-30 NOTE — TELEPHONE ENCOUNTER
Attempted to call patient to discuss lab results, nobody answered phone call. Pap last week (which is 12 month f/u co-testing after negative colpo done for ASCUS and HPV+) returned LGSIL and HPV+, per ASCCP guidelines patient needs repeat colposcopy    Discussed with Dr. Meseret Ellsworth, would like to schedule patient an appt during dates when Dr. Meseret Ellsworth is precepting and I am available in the clinic.  Per the schedule, these include:  Tues 8/7 in the PM  Fri 8/17 PM  Tues 8/21 PM  Fri 24th PM  Tues 28th PM  Or Friday 31st PM    Will try to call back later and will also send patient message in [address], MD  1:50 PM

## 2018-07-30 NOTE — TELEPHONE ENCOUNTER
Patient called back. We spoke about Pap results and how a repeat colposcopy is recommended. I sent her the dates that I am available via [employer], patient to look at her calendar tonight and will call the clinic to schedule an appointment. Does have some level of anxiety, says last time for the first colposcopy they had to try a few times and it was a bad experience, required some xanax prior to the colposcopy. Stated that once we have a date set I can send in a low dose on time xanax to be taken prior to the procedure.

## 2018-08-06 NOTE — TELEPHONE ENCOUNTER
Patient called stating that she usually has xanax prescribed when she gets her colpo done for her anxiety and is requesting a refill Colpo scheduled for 8/24/18

## 2018-08-08 DIAGNOSIS — F41.9 ANXIETY: Primary | ICD-10-CM

## 2018-08-08 RX ORDER — ALPRAZOLAM 1 MG/1
TABLET ORAL
Qty: 4 TAB | Refills: 0 | Status: SHIPPED | OUTPATIENT
Start: 2018-08-08 | End: 2018-08-15 | Stop reason: SDUPTHER

## 2018-08-08 RX ORDER — ALPRAZOLAM 1 MG/1
TABLET ORAL
Qty: 4 TAB | Refills: 0 | Status: CANCELLED | OUTPATIENT
Start: 2018-08-08

## 2018-08-08 NOTE — TELEPHONE ENCOUNTER
Unable to escribe medication  Have left physical script at the  for patient to   Will ask staff to inform patient

## 2018-08-08 NOTE — TELEPHONE ENCOUNTER
Noticed that patient had already emailed Dr. Rosalba Davis to ask why epresciption was refused  Called patient and informed her a physical script is waiting at the  for her, that xanax cannot be e-prescribed.   She expressed understanding and will pick it up at her convenience

## 2018-08-12 ENCOUNTER — PATIENT MESSAGE (OUTPATIENT)
Dept: FAMILY MEDICINE CLINIC | Age: 38
End: 2018-08-12

## 2018-08-14 DIAGNOSIS — F41.9 ANXIETY: ICD-10-CM

## 2018-08-14 NOTE — TELEPHONE ENCOUNTER
Called patient's pharmacy and spoke to pharmacy tech. Problem occurred because I accidentally listed \"no supervision\" instead of \"indirect supervision\" so no attending ROBERT number appeared on the prescription as it is a controlled substance. Gave Dr. Robyn Oviedo number and pharmacy said it should go through except that patient took the paper prescription with her  and they cannot fill it at this time as they no longer have the prescription. Tech states she will make a note of the attending ROBERT number and when patient brings paper script back she should be able to have it filled. Attempted to call patient's work and mobile to inform her of this but it went to , did not leave .  Will send message through [email or web address] and also update Dr. Liz Pritchett MD

## 2018-08-15 ENCOUNTER — DOCUMENTATION ONLY (OUTPATIENT)
Dept: FAMILY MEDICINE CLINIC | Age: 38
End: 2018-08-15

## 2018-08-15 DIAGNOSIS — F41.9 ANXIETY: ICD-10-CM

## 2018-08-15 RX ORDER — ALPRAZOLAM 1 MG/1
TABLET ORAL
Qty: 4 TAB | Refills: 0 | Status: SHIPPED | OUTPATIENT
Start: 2018-08-15 | End: 2018-08-24 | Stop reason: SDUPTHER

## 2018-08-15 NOTE — PROGRESS NOTES
Patient emailed stating the pharmacy did not return the paper script, she called and spoke with them and they had disposed of it. Have printed out a new prescription, this one with attending's ROBERT on it, and placed at  for patient to . Sent pt a message informing her of this.      Karen Martinez MD

## 2018-08-24 ENCOUNTER — TELEPHONE (OUTPATIENT)
Dept: FAMILY MEDICINE CLINIC | Age: 38
End: 2018-08-24

## 2018-08-24 DIAGNOSIS — F41.9 ANXIETY: ICD-10-CM

## 2018-08-24 RX ORDER — ALPRAZOLAM 1 MG/1
TABLET ORAL
Qty: 3 TAB | Refills: 0 | Status: SHIPPED | OUTPATIENT
Start: 2018-08-24 | End: 2019-10-14 | Stop reason: ALTCHOICE

## 2018-08-24 NOTE — TELEPHONE ENCOUNTER
Issue with Resident ROBERT not being registered to prescribe rx       ALPRAZolam Valdemar Draper) 1 mg tablet      Call transferred , nurse Rona Isaac

## 2018-08-29 ENCOUNTER — DOCUMENTATION ONLY (OUTPATIENT)
Dept: FAMILY MEDICINE CLINIC | Age: 38
End: 2018-08-29

## 2018-09-26 ENCOUNTER — HOSPITAL ENCOUNTER (OUTPATIENT)
Dept: LAB | Age: 38
Discharge: HOME | End: 2018-09-26

## 2018-09-26 ENCOUNTER — OFFICE VISIT (OUTPATIENT)
Dept: FAMILY MEDICINE CLINIC | Age: 38
End: 2018-09-26

## 2018-09-26 VITALS
SYSTOLIC BLOOD PRESSURE: 99 MMHG | HEART RATE: 79 BPM | OXYGEN SATURATION: 97 % | RESPIRATION RATE: 16 BRPM | DIASTOLIC BLOOD PRESSURE: 66 MMHG | BODY MASS INDEX: 31.76 KG/M2 | WEIGHT: 186 LBS | HEIGHT: 64 IN | TEMPERATURE: 99.1 F

## 2018-09-26 DIAGNOSIS — R87.612 LOW GRADE SQUAMOUS INTRAEPITHELIAL LESION ON CYTOLOGIC SMEAR OF CERVIX (LGSIL): Primary | ICD-10-CM

## 2018-09-26 LAB
HCG URINE, QL. (POC): NEGATIVE
VALID INTERNAL CONTROL?: YES

## 2018-09-26 NOTE — PATIENT INSTRUCTIONS
If you have vaginal bleeding more than a period, please let us know. You can expect to have some minor vaginal bleeding and spotting for the next few days. Colposcopy: What to Expect at [address] may feel some soreness in your vagina for a day or two if you had a biopsy. Some vaginal bleeding or discharge is normal for up to a week after a biopsy. The discharge may be dark-colored if a solution was put on your cervix. You can use a sanitary pad for the bleeding. It may take a week or two for you to get the test results. This care sheet gives you a general idea about how long it will take for you to recover. But each person recovers at a different pace. Follow the steps below to feel better as quickly as possible. How can you care for yourself at home? Activity    · You can return to work and most daily activities right after the test.   Exercise    · Do not exercise for 1 day after the test.   Medicines    · Your doctor will tell you if and when you can restart your medicines. He or she will also give you instructions about taking any new medicines.     · If you take blood thinners, such as warfarin (Coumadin), clopidogrel (Plavix), or aspirin, be sure to talk to your doctor. He or she will tell you if and when to start taking those medicines again. Make sure that you understand exactly what your doctor wants you to do.     · Take an over-the-counter pain medicine, such as acetaminophen (Tylenol), ibuprofen (Advil, Motrin), or naproxen (Aleve). Be safe with medicines. Read and follow all instructions on the label. Do not take two or more pain medicines at the same time unless the doctor told you to. Many pain medicines have acetaminophen, which is Tylenol. Too much acetaminophen (Tylenol) can be harmful. Other instructions    · Use a pad if you have some bleeding.     · Do not douche, have sexual intercourse, or use tampons for 1 week if you had a biopsy.  This will allow time for your cervix to heal.     · You can take a bath or shower anytime after the test.   Follow-up care is a key part of your treatment and safety. Be sure to make and go to all appointments, and call your doctor if you are having problems. It's also a good idea to know your test results and keep a list of the medicines you take. When should you call for help? Call your doctor now or seek immediate medical care if:    · You have severe vaginal bleeding. This means that you are soaking through your usual pads or tampons each hour for 2 or more hours.     · You have pain that does not get better after you take pain medicine.     · You have signs of infection, such as:  ¨ Increased pain. ¨ Bad-smelling vaginal discharge. ¨ A fever.    Watch closely for any changes in your health, and be sure to contact your doctor if:    · You have questions or concerns. Where can you learn more? Go to http://michelle-charity.info/. Enter M523 in the search box to learn more about \"Colposcopy: What to Expect at Home. \"  Current as of: May 12, 2017  Content Version: 11.7  © 9682-3392 [employer], Incorporated. Care instructions adapted under license by [employer] (which disclaims liability or warranty for this information). If you have questions about a medical condition or this instruction, always ask your healthcare professional. [address] any warranty or liability for your use of this information.

## 2018-09-26 NOTE — PROGRESS NOTES
Colposcopy Procedure Note    September 26, 2018    Dorethea Creamer  34 y.o.  F [id number]  LMP:  9/10/2018 9/10/2018       Procedure:   Colposcopy     Indications:   5/2017 Pap ASCUS, HPV+ (all previous Paps were normal)  6/2017 Colposcopy benign. Recommended repeat co-testing in 1 year  7/2018 Pap LGSIL, HPV+, repeat colposcopy recommended    Took 1mg xanax before today. UPT negative  Declines flu vaccine today    Preop DX:       ICD-10-CM ICD-9-CM    1. Low grade squamous intraepithelial lesion on cytologic smear of cervix (LGSIL) R87.612 795.03             [facility]  OFFICE PROCEDURE PROGRESS NOTE     Chart reviewed for the following:                        Freeman Michael MD, have reviewed the History, Physical and updated the Allergic reactions for Dorethea Creamer      TIME OUT performed immediately prior to start of procedure:                        IChristian MD have performed the following reviews on Dorethea Creamer prior to the start of the procedure:      * Patient was identified by name and date of birth   * Agreement on procedure being performed was verified  * Risks and Benefits explained to the patient  * Procedure site verified and marked as necessary  * Patient was positioned for comfort  * Consent was signed and verified      Time: 9:40am     Date of procedure: 09/26/18     Procedure performed by:  Christian Cosby MD     Provider assisted by: Dr. Ever Anderson DO and Gemma Quiroga LPN     Procedure Details   The risks and benefits of the procedure and written informed consent obtained. A timeout was taken to verify the patient and procedure to be performed. Speculum placed in vagina and excellent visualization of cervix achieved. Findings:  Cervix:  endocervical speculum placed, cervix swabbed with acetic acid. SCJ visualized. Lesion at 10 o'clock (abnormal vasculature), 12 o'clock (acetowhite changes), and 6 o'clock (acetowhite changes).  Cervical biopsies taken at 10, 12, and 6 o'clock, specimen labelled and sent to pathology. Endocervical curettage performed. Hemostasis achieved with Monsel's solution. Vaginal inspection: normal without visible lesions. Vulvar colposcopy: normal mucosa without lesions. Specimens:  10 o'clock cervical biopsy  12 o'clock cervical biopsy  6 o'clock cervical biopsy  ECC     Complications: none. Plan:    ICD-10-CM ICD-9-CM    1. Low grade squamous intraepithelial lesion on cytologic smear of cervix (LGSIL) R87.612 795.03 AMB POC URINE PREGNANCY TEST, VISUAL COLOR COMPARISON      MN COLPOSC,CERVIX W/ADJ VAG,W/BX & CURRETAG      SURGICAL PATHOLOGY     - UPT negative  - Cervical biopsy and ECC specimens labelled and sent to Pathology.  Will base further treatment on Pathology findings  - Treatment options discussed with patient  - Post biopsy instructions given to patient    Patient seen and discussed with Dr. Larry Saldana DO (attending physician)     Annemarie Juan MD  09/26/18

## 2018-09-26 NOTE — MR AVS SNAPSHOT
[address] 
[phone] Patient: Baudilio Camarillo MRN: [id number] Visit Information Date & Time Provider Department Dept. Phone Encounter #  
 9/26/2018  9:15 AM Iran Olszewski, MD [address] [facility] [phone] [id number] Upcoming Health Maintenance Date Due DTaP/Tdap/Td series (1 - Tdap) 6/11/1999 Influenza Age 5 to Adult 8/1/2018 PAP AKA CERVICAL CYTOLOGY 7/23/2021 Allergies as of 9/26/2018  Review Complete On: 9/26/2018 By: Vertis Fabry, LPN Severity Noted Reaction Type Reactions Latex  05/15/2017    Hives Azithromycin  05/15/2017    Hives Sulfa (Sulfonamide Antibiotics)  05/15/2017    Hives Current Immunizations  Reviewed on 7/23/2018 No immunizations on file. Not reviewed this visit You Were Diagnosed With   
  
 Codes Comments Low grade squamous intraepithelial lesion on cytologic smear of cervix (LGSIL)    -  Primary ICD-10-CM: O43.969 ICD-9-CM: 795.03 Vitals BP Pulse Temp Resp Height(growth percentile) Weight(growth percentile) 99/66 (BP 1 Location: Left arm, BP Patient Position: Sitting) 79 99.1 °F (37.3 °C) (Oral) 16 5' 3.75\" (1.619 m) 186 lb (84.4 kg) LMP SpO2 BMI OB Status Smoking Status 09/10/2018 (Exact Date) 97% 32.18 kg/m2 Having regular periods Never Smoker Vitals History BMI and BSA Data Body Mass Index Body Surface Area  
 32.18 kg/m 2 1.95 m 2 Preferred Pharmacy Pharmacy Name Phone [address], [facility] [address] [phone] Your Updated Medication List  
  
   
This list is accurate as of 9/26/18  9:53 AM.  Always use your most recent med list.  
  
  
  
  
 ALPRAZolam 1 mg tablet Commonly known as:  Sylwia Fell Take one tab by mouth the morning of procedure and then 20 minutes prior to procedure We Performed the Following AMB POC URINE PREGNANCY TEST, VISUAL COLOR COMPARISON [79282 CPT(R)] Patient Instructions If you have vaginal bleeding more than a period, please let us know. You can expect to have some minor vaginal bleeding and spotting for the next few days. Colposcopy: What to Expect at [facility] Your Recovery You may feel some soreness in your vagina for a day or two if you had a biopsy. Some vaginal bleeding or discharge is normal for up to a week after a biopsy. The discharge may be dark-colored if a solution was put on your cervix. You can use a sanitary pad for the bleeding. It may take a week or two for you to get the test results. This care sheet gives you a general idea about how long it will take for you to recover. But each person recovers at a different pace. Follow the steps below to feel better as quickly as possible. How can you care for yourself at home? Activity 
  · You can return to work and most daily activities right after the test.  
Exercise 
  · Do not exercise for 1 day after the test.  
Medicines 
  · Your doctor will tell you if and when you can restart your medicines. He or she will also give you instructions about taking any new medicines.  
  · If you take blood thinners, such as warfarin (Coumadin), clopidogrel (Plavix), or aspirin, be sure to talk to your doctor. He or she will tell you if and when to start taking those medicines again. Make sure that you understand exactly what your doctor wants you to do.  
  · Take an over-the-counter pain medicine, such as acetaminophen (Tylenol), ibuprofen (Advil, Motrin), or naproxen (Aleve). Be safe with medicines. Read and follow all instructions on the label. Do not take two or more pain medicines at the same time unless the doctor told you to. Many pain medicines have acetaminophen, which is Tylenol. Too much acetaminophen (Tylenol) can be harmful. Other instructions   · Use a pad if you have some bleeding.  
  · Do not douche, have sexual intercourse, or use tampons for 1 week if you had a biopsy. This will allow time for your cervix to heal.  
  · You can take a bath or shower anytime after the test.  
Follow-up care is a key part of your treatment and safety. Be sure to make and go to all appointments, and call your doctor if you are having problems. It's also a good idea to know your test results and keep a list of the medicines you take. When should you call for help? Call your doctor now or seek immediate medical care if: 
  · You have severe vaginal bleeding. This means that you are soaking through your usual pads or tampons each hour for 2 or more hours.  
  · You have pain that does not get better after you take pain medicine.  
  · You have signs of infection, such as: 
¨ Increased pain. ¨ Bad-smelling vaginal discharge. ¨ A fever.  
 Watch closely for any changes in your health, and be sure to contact your doctor if: 
  · You have questions or concerns. Where can you learn more? Go to http://michelle-charity.info/. Enter M523 in the search box to learn more about \"Colposcopy: What to Expect at Home. \" Current as of: May 12, 2017 Content Version: 11.7 © 1144-8471 [employer]. Care instructions adapted under license by [employer] (which disclaims liability or warranty for this information). If you have questions about a medical condition or this instruction, always ask your healthcare professional. [place] [address] any warranty or liability for your use of this information. Introducing [facility]! Dear True Lo: Thank you for requesting a [employer] account. Our records indicate that you already have an active [employer] account. You can access your account anytime at [email or web address] Did you know that you can access your hospital and ER discharge instructions at any time in [employer]Cast? You can also review all of your test results from your hospital stay or ER visit. Additional Information If you have questions, please visit the Frequently Asked Questions section of the [employer] website at [email or web address] Remember, FlyCast is NOT to be used for urgent needs. For medical emergencies, dial 911. Now available from your iPhone and Android! Please provide this summary of care documentation to your next provider. Your primary care clinician is listed as Grace Holloway. If you have any questions after today's visit, please call [phone].

## 2018-10-01 NOTE — PROGRESS NOTES
I saw and evaluated the patient, performing the key elements of the service. I discussed the findings, assessment and plan with the resident and agree with the resident's findings and plan as documented in the resident's note. I was present for and supervised the entire procedure. See resident note for details. (Minor procedure <5 minutes)      In addition to above specimens mentioned, also ECC.

## 2018-10-05 ENCOUNTER — DOCUMENTATION ONLY (OUTPATIENT)
Dept: FAMILY MEDICINE CLINIC | Age: 38
End: 2018-10-05

## 2018-10-05 NOTE — PROGRESS NOTES
Reviewed results of colposcopy with Dr. Sharon Gross    Specimen #3 with PADMAJA I  Specimen #4 w dysplastic squamous mucosa but no PADMAJA grading specified. Called the pathology lab. Pathologist is not in at this time, but left message with [facility] (Pathology Coordinators) for pathologist to please re-review the specimen and grade the dysplasia in Specimen #4 either PADMAJA I, II, or III. Clinic phone number and my name was provided to them. Will await response as it will dictate further management.      Vidal Desir MD

## 2018-10-10 ENCOUNTER — DOCUMENTATION ONLY (OUTPATIENT)
Dept: FAMILY MEDICINE CLINIC | Age: 38
End: 2018-10-10

## 2018-10-10 NOTE — PROGRESS NOTES
Received notice that Dr. Sowmya Bey (Pathology) had called the office back but I was not in. Called Dr. Wendy Rivera, went to . Left  with my personal cell phone. Awaiting call back regarding grading of dysplasia of colposcopy.

## 2018-10-11 ENCOUNTER — DOCUMENTATION ONLY (OUTPATIENT)
Dept: FAMILY MEDICINE CLINIC | Age: 38
End: 2018-10-11

## 2018-10-11 NOTE — PROGRESS NOTES
Received call back from Dr. Christelle Boateng (Pathology)    She explained that the ECC cannot be graded PADMAJA I, II, or III, as it is an ECC and not a biopsy and the fragment was not large enough to accurately grade the dysplasia. There was concern as moderate dysplasia was seen with mitotic fragments but it is possible it could have been mild dysplasia and the fragment of tissue was too small to determine. Also reports 2 out of 3 cervical biopsies did not include the transformation zone. Biopsy #3 was in the transformation zone and was PADMAJA I.     - If we interpret the colposcopy results as PADMAJA I, the next step is repeat co-testing at 12 months. - If we interpret the colposcopy results as PADMAJA II, the next step is a diagnostic excisional procedure, though ASCCP guidelines also state that if PADMAJA II or III is identified in post-procedure ECC, cytology and ECC at 4-6 months is preferred.        Will discuss with Dr. Sariah Balderas
room air

## 2018-10-15 ENCOUNTER — TELEPHONE (OUTPATIENT)
Dept: FAMILY MEDICINE CLINIC | Age: 38
End: 2018-10-15

## 2018-10-15 NOTE — TELEPHONE ENCOUNTER
----- Message from [facility] sent at 10/15/2018  9:29 AM EDT -----  Regarding: Dr. Espinoza Reddy  Pt [phone] says that she needs a explanation of her colposcopy results.

## 2018-10-16 NOTE — TELEPHONE ENCOUNTER
Called patient, spoke with her at length regarding colpo results    Needs repeat co-testing in 1 year (9/2019)  Patient grateful for the results and expresses understanding. All questions answered.      Brody Reed MD  10/16/18  12:37 PM

## 2019-10-14 ENCOUNTER — OFFICE VISIT (OUTPATIENT)
Dept: FAMILY MEDICINE CLINIC | Age: 39
End: 2019-10-14

## 2019-10-14 ENCOUNTER — HOSPITAL ENCOUNTER (OUTPATIENT)
Dept: LAB | Age: 39
Discharge: HOME | End: 2019-10-14
Payer: COMMERCIAL

## 2019-10-14 VITALS
OXYGEN SATURATION: 98 % | HEART RATE: 79 BPM | SYSTOLIC BLOOD PRESSURE: 104 MMHG | BODY MASS INDEX: 33.29 KG/M2 | WEIGHT: 195 LBS | HEIGHT: 64 IN | RESPIRATION RATE: 18 BRPM | DIASTOLIC BLOOD PRESSURE: 73 MMHG | TEMPERATURE: 98.9 F

## 2019-10-14 DIAGNOSIS — G47.09 OTHER INSOMNIA: ICD-10-CM

## 2019-10-14 DIAGNOSIS — Z12.39 SCREENING FOR BREAST CANCER: ICD-10-CM

## 2019-10-14 DIAGNOSIS — Z01.419 WELL WOMAN EXAM WITH ROUTINE GYNECOLOGICAL EXAM: ICD-10-CM

## 2019-10-14 DIAGNOSIS — R63.5 WEIGHT GAIN: ICD-10-CM

## 2019-10-14 DIAGNOSIS — Z11.3 SCREENING FOR STDS (SEXUALLY TRANSMITTED DISEASES): Primary | ICD-10-CM

## 2019-10-14 DIAGNOSIS — Z13.220 SCREENING FOR HYPERLIPIDEMIA: ICD-10-CM

## 2019-10-14 DIAGNOSIS — E66.9 OBESITY (BMI 30-39.9): ICD-10-CM

## 2019-10-14 PROCEDURE — 87625 HPV TYPES 16 & 18 ONLY: CPT

## 2019-10-14 PROCEDURE — 87624 HPV HI-RISK TYP POOLED RSLT: CPT

## 2019-10-14 PROCEDURE — 88175 CYTOPATH C/V AUTO FLUID REDO: CPT

## 2019-10-14 NOTE — PROGRESS NOTES
Identified Patient with two Patient identifiers (Name and ). Two Patient Identifiers confirmed. Reviewed record in preparation for visit and have obtained necessary documentation. Chief Complaint   Patient presents with    Well Woman     with pap and labs       Visit Vitals  /73 (BP 1 Location: Right arm, BP Patient Position: Sitting)   Pulse 79   Temp 98.9 °F (37.2 °C) (Oral)   Resp 18   Ht 5' 3.75\" (1.619 m)   Wt 195 lb (88.5 kg)   SpO2 98%   BMI 33.73 kg/m²       1. Have you been to the ER, urgent care clinic since your last visit? Hospitalized since your last visit? No    2. Have you seen or consulted any other health care providers outside of the [address] [place] [address] since your last visit? Include any pap smears or colon screening.  No

## 2019-10-14 NOTE — PROGRESS NOTES
[address] [place] [address]    Subjective:     CC: CPE    History provided by patient     Nelida Deutsch is a 37 y.o. female with no significant PMH coming in for a CPE    Medical Hx and Medications: No medications  Surg Hx: Tubal ligation   FH: Breast  Cancer- grandmother's sister, HTN- maternal grandparents, paternal grandparent- HA  Allergies: Blueberries (hives), Sulfa (hives), Latex (hives)     Preventative measures:   Colonoscopy: NA  TDAP: Discussed, pt will consider   Mammogram: referral sent   PAP: performed today   Shingle vaccine: NA  Pneumococcal vaccine: NA    Social measures:  Smoking: No   Alcohol:No  Exercise: Not yet, has "[employer]" but hard to schedule time due to work commitments  Diet: No sodas, otherwise pt has not made any other dietary changes. States she actually has low caloric intake over past few months since she has been busy    Insomnia: Goes to bed at 9 pm. Is able to fall asleep right away. Does not snore or wakes up SOB. Wakes up around 1.30-2 am. Never gets a full night sleep. Denies anxiety. Has tried melatonin nightly which does not work. Has been ongoing for 6 months. Previous to this has never had sleep problems. Current Outpatient Medications on File Prior to Visit   Medication Sig Dispense Refill    [DISCONTINUED] ALPRAZolam (XANAX) 1 mg tablet Take one tab by mouth the morning of procedure and then 20 minutes prior to procedure 3 Tab 0     No current facility-administered medications on file prior to visit. There is no problem list on file for this patient.       Social History     Socioeconomic History    Marital status:      Spouse name: Not on file    Number of children: Not on file    Years of education: Not on file    Highest education level: Not on file   Occupational History    Not on file   Social Needs    Financial resource strain: Not on file    Food insecurity:     Worry: Not on file     Inability: Not on file   [facility] Transportation needs:     Medical: Not on file     Non-medical: Not on file   Tobacco Use    Smoking status: Never Smoker    Smokeless tobacco: Never Used   Substance and Sexual Activity    Alcohol use: No    Drug use: No    Sexual activity: Yes     Partners: Female     Birth control/protection: Surgical   Lifestyle    Physical activity:     Days per week: Not on file     Minutes per session: Not on file    Stress: Not on file   Relationships    Social connections:     Talks on phone: Not on file     Gets together: Not on file     Attends Hinduism service: Not on file     Active member of club or organization: Not on file     Attends meetings of clubs or organizations: Not on file     Relationship status: Not on file    Intimate partner violence:     Fear of current or ex partner: Not on file     Emotionally abused: Not on file     Physically abused: Not on file     Forced sexual activity: Not on file   Other Topics Concern    Not on file   Social History Narrative    Not on file       Review of Systems   Constitutional: Negative for chills and fever. HENT: Negative for congestion and sore throat. Respiratory: Negative for cough and shortness of breath. Cardiovascular: Negative for chest pain and palpitations. Gastrointestinal: Negative for diarrhea, nausea and vomiting. Genitourinary: Negative for dysuria and urgency. Musculoskeletal: Negative for myalgias. Psychiatric/Behavioral: Negative for depression and suicidal ideas. The patient has insomnia. Objective:     Visit Vitals  /73 (BP 1 Location: Right arm, BP Patient Position: Sitting)   Pulse 79   Temp 98.9 °F (37.2 °C) (Oral)   Resp 18   Ht 5' 3.75\" (1.619 m)   Wt 195 lb (88.5 kg)   LMP 09/25/2019 (Exact Date)   SpO2 98%   BMI 33.73 kg/m²        Physical Exam   Constitutional: She is oriented to person, place, and time. She appears well-developed and well-nourished. Neck: Normal range of motion. Neck supple. Cardiovascular: Normal rate and regular rhythm. Exam reveals no friction rub. No murmur heard. Pulmonary/Chest: Effort normal and breath sounds normal.   Abdominal: Soft. Bowel sounds are normal. She exhibits no distension. There is no tenderness. There is no guarding. Genitourinary: Vagina normal and uterus normal. Pelvic exam was performed with patient prone. There is no rash on the right labia. There is no rash on the left labia. No vaginal discharge found. Neurological: She is alert and oriented to person, place, and time. Skin: Skin is warm and dry. Assessment and orders: Well woman exam with routine gynecological exam:   - CBC W/O DIFF    Screening for STDs (sexually transmitted diseases):  - HIV 1/2 AG/AB, 4TH GENERATION,W RFLX CONFIRM  - HEPATITIS PANEL, ACUTE  - CHLAMYDIA/GC PCR    Obesity (BMI 30-30.9, with recent weight gain): Pt with weight gain wo any increased caloric intake. Does have a FH of thyroid disorder and would like it checked today  - TSH REFLEX TO T4  - Discussed diet and exercise and offered ACAC program to assist, pt declined program at this time     Other insomnia: New onset insomnia over past 6 months. Discussed sleep hygiene.   - SLEEP MEDICINE REFERRAL  - OTC Benadryl 25-50 mg as needed nightly     Prediabetes: Previous A1c 5.2  - Recheck Hgb A1c today     Preventative Measures:  - Pt will see if she has previously received TDAP at her work   - PHANI MAMMO BI SCREENING INCL CAD; Future  - PAP + HPV co testing today     I have discussed the diagnosis with the patient and the intended plan as seen in the above orders. Social history, medical history, and labs were reviewed. The patient has received an after-visit summary and questions were answered concerning future plans. I have discussed medication side effects and warnings with the patient as well.     Jose Valiente DO  Resident [address] [place] [address]  10/14/19    Cased was discussed with  Juliann (attending physician)

## 2019-10-15 LAB
CHOLEST SERPL-MCNC: 199 MG/DL (ref 100–199)
ERYTHROCYTE [DISTWIDTH] IN BLOOD BY AUTOMATED COUNT: 12.4 % (ref 12.3–15.4)
HAV IGM SERPL QL IA: NEGATIVE
HBV CORE IGM SERPL QL IA: NEGATIVE
HBV SURFACE AG SERPL QL IA: NEGATIVE
HCT VFR BLD AUTO: 39.6 % (ref 34–46.6)
HCV AB S/CO SERPL IA: 0.1 S/CO RATIO (ref 0–0.9)
HDLC SERPL-MCNC: 69 MG/DL
HGB BLD-MCNC: 13.4 G/DL (ref 11.1–15.9)
HIV 1+2 AB+HIV1 P24 AG SERPL QL IA: NON REACTIVE
INTERPRETATION, 910389: NORMAL
LDLC SERPL CALC-MCNC: 99 MG/DL (ref 0–99)
MCH RBC QN AUTO: 29.2 PG (ref 26.6–33)
MCHC RBC AUTO-ENTMCNC: 33.8 G/DL (ref 31.5–35.7)
MCV RBC AUTO: 86 FL (ref 79–97)
PLATELET # BLD AUTO: 300 X10E3/UL (ref 150–450)
RBC # BLD AUTO: 4.59 X10E6/UL (ref 3.77–5.28)
TRIGL SERPL-MCNC: 153 MG/DL (ref 0–149)
TSH SERPL DL<=0.005 MIU/L-ACNC: 3.94 UIU/ML (ref 0.45–4.5)
VLDLC SERPL CALC-MCNC: 31 MG/DL (ref 5–40)
WBC # BLD AUTO: 10.8 X10E3/UL (ref 3.4–10.8)

## 2019-10-16 LAB
C TRACH RRNA SPEC QL NAA+PROBE: NEGATIVE
EST. AVERAGE GLUCOSE BLD GHB EST-MCNC: 111 MG/DL
HBA1C MFR BLD: 5.5 % (ref 4.8–5.6)
N GONORRHOEA RRNA SPEC QL NAA+PROBE: NEGATIVE
SPECIMEN STATUS REPORT, ROLRST: NORMAL

## 2019-10-16 NOTE — PROGRESS NOTES
STD testing neg: including HIV, GC and hepatitis testing     A1c: 5.5 (previous 5.2)    Lipid melvina vasquez ASCVD score of 0.1%    Will send mychart message

## 2019-10-21 NOTE — PROGRESS NOTES
I reviewed with the resident the medical history and the resident's findings on the physical examination. I discussed with the resident the patient's diagnosis and concur with the plan. Coco Schilling

## 2019-10-28 ENCOUNTER — TELEPHONE (OUTPATIENT)
Dept: FAMILY MEDICINE CLINIC | Age: 39
End: 2019-10-28

## 2019-10-28 DIAGNOSIS — R87.612 LOW GRADE SQUAMOUS INTRAEPITHELIAL LESION ON CYTOLOGIC SMEAR OF CERVIX (LGSIL): Primary | ICD-10-CM

## 2019-10-28 NOTE — TELEPHONE ENCOUNTER
----- Message from Hermann Spaulding DO sent at 10/24/2019  3:07 PM EDT -----  Regarding: PAP results  Hi Dr. Johnson Post,     Pt is s/p colpo last year and I did the co testing last week which came back + HPV and LSIL. Per algorithm we will proceed with colpo at this time. (This will be her 3rd colpo)    Just wanted to check in with you before I schedule it. This will be my 1st colpo in out clinic!     Aleksandra     ----- Message -----  From: [facility] Results  Sent: 10/24/2019   1:43 PM EDT  To: Hermann Spaulding DO

## 2019-10-28 NOTE — TELEPHONE ENCOUNTER
Dr. Ellie Noyola called patient to schedule. Patient will call back to see when she can come in and take off work.

## 2019-10-30 RX ORDER — ALPRAZOLAM 1 MG/1
1 TABLET ORAL ONCE
Qty: 1 TAB | Refills: 0 | Status: SHIPPED | OUTPATIENT
Start: 2019-10-30 | End: 2019-10-30

## 2019-10-30 NOTE — TELEPHONE ENCOUNTER
Called patient and scheduled colposcopy with Dr. Kandi Schuler on November 11th. Patient requesting prescription for Xanax before procedure. She states she cannot get procedure unless she takes this medication.

## 2020-02-06 ENCOUNTER — HOSPITAL ENCOUNTER (OUTPATIENT)
Dept: LAB | Age: 40
Discharge: HOME | End: 2020-02-06

## 2020-02-06 ENCOUNTER — OFFICE VISIT (OUTPATIENT)
Dept: FAMILY MEDICINE CLINIC | Age: 40
End: 2020-02-06

## 2020-02-06 VITALS
BODY MASS INDEX: 33.12 KG/M2 | WEIGHT: 194 LBS | TEMPERATURE: 99 F | RESPIRATION RATE: 16 BRPM | DIASTOLIC BLOOD PRESSURE: 73 MMHG | SYSTOLIC BLOOD PRESSURE: 110 MMHG | HEART RATE: 83 BPM | HEIGHT: 64 IN | OXYGEN SATURATION: 99 %

## 2020-02-06 DIAGNOSIS — N89.8 VAGINAL ITCHING: ICD-10-CM

## 2020-02-06 DIAGNOSIS — N76.0 BACTERIAL VAGINOSIS: ICD-10-CM

## 2020-02-06 DIAGNOSIS — N89.8 VAGINAL ITCHING: Primary | ICD-10-CM

## 2020-02-06 DIAGNOSIS — B96.89 BACTERIAL VAGINOSIS: ICD-10-CM

## 2020-02-06 LAB
BILIRUB UR QL STRIP: NEGATIVE
GLUCOSE UR-MCNC: NEGATIVE MG/DL
KETONES P FAST UR STRIP-MCNC: NEGATIVE MG/DL
PH UR STRIP: 7 [PH] (ref 4.6–8)
PROT UR QL STRIP: NEGATIVE
SP GR UR STRIP: 1.02 (ref 1–1.03)
UA UROBILINOGEN AMB POC: NORMAL (ref 0.2–1)
URINALYSIS CLARITY POC: CLEAR
URINALYSIS COLOR POC: YELLOW
URINE BLOOD POC: NEGATIVE
URINE LEUKOCYTES POC: NEGATIVE
URINE NITRITES POC: NEGATIVE
WET MOUNT POCT, WMPOCT: NORMAL

## 2020-02-06 RX ORDER — METRONIDAZOLE 500 MG/1
500 TABLET ORAL 2 TIMES DAILY
Qty: 14 TAB | Refills: 0 | Status: SHIPPED | OUTPATIENT
Start: 2020-02-06 | End: 2020-02-13

## 2020-02-06 NOTE — PROGRESS NOTES
Chief Complaint   Patient presents with    Vaginal Itching     1. Have you been to the ER, urgent care clinic since your last visit? Hospitalized since your last visit? No    2. Have you seen or consulted any other health care providers outside of the [address] [place] [address] since your last visit? Include any pap smears or colon screening.  No

## 2020-02-06 NOTE — PATIENT INSTRUCTIONS
Schedule an appointment to return for your colposcopy!!! Bacterial Vaginosis: Care Instructions  Your Care Instructions    Bacterial vaginosis is a type of vaginal infection. It is caused by excess growth of certain bacteria that are normally found in the vagina. Symptoms can include itching, swelling, pain when you urinate or have sex, and a gray or yellow discharge with a \"fishy\" odor. It is not considered an infection that is spread through sexual contact. Although symptoms can be annoying and uncomfortable, bacterial vaginosis does not usually cause other health problems. However, if you have it while you are pregnant, it can cause complications. While the infection may go away on its own, most doctors use antibiotics to treat it. You may have been prescribed pills or vaginal cream. With treatment, bacterial vaginosis usually clears up in 5 to 7 days. Follow-up care is a key part of your treatment and safety. Be sure to make and go to all appointments, and call your doctor if you are having problems. It's also a good idea to know your test results and keep a list of the medicines you take. How can you care for yourself at home? · Take your antibiotics as directed. Do not stop taking them just because you feel better. You need to take the full course of antibiotics. · Do not eat or drink anything that contains alcohol if you are taking metronidazole (Flagyl). · Keep using your medicine if you start your period. Use pads instead of tampons while using a vaginal cream or suppository. Tampons can absorb the medicine. · Wear loose cotton clothing. Do not wear nylon and other materials that hold body heat and moisture close to the skin. · Do not scratch. Relieve itching with a cold pack or a cool bath. · Do not wash your vaginal area more than once a day. Use plain water or a mild, unscented soap. Do not douche. When should you call for help?   Watch closely for changes in your health, and be sure to contact your doctor if:    · You have unexpected vaginal bleeding.     · You have a fever.     · You have new or increased pain in your vagina or pelvis.     · You are not getting better after 1 week.     · Your symptoms return after you finish the course of your medicine. Where can you learn more? Go to http://michelle-charity.info/. [place] in the search box to learn more about \"Bacterial Vaginosis: Care Instructions. \"  Current as of: February 19, 2019  Content Version: 12.2  © 9739-3316 [employer]. Care instructions adapted under license by [employer] (which disclaims liability or warranty for this information). If you have questions about a medical condition or this instruction, always ask your healthcare professional. [address] any warranty or liability for your use of this information.

## 2020-02-06 NOTE — PROGRESS NOTES
HPI       Chief Complaint: vaginal itching    Brea Cote is a 37 y.o. female who presents vaginal itching    Vaginal itching - x 2 days. believes she has a yeast infection. No vaginal discharge. Present throughout the day but seems to be worse at night. No dysuria, changes in urinary frequency/urgency. Last yeast infection was many years ago. Pt is sexually active but no intercourse in 2-3 mos. No concern for STDs. No visible ulcerations or lesions. Last Pap 10/2019, pt is due for colposcopy but has not scheduled this at this time. LMP was 1/27-1/31/2020. PMHx:  History reviewed. No pertinent past medical history. Meds:     Allergies: Allergies   Allergen Reactions    Latex Hives    Azithromycin Hives    Sulfa (Sulfonamide Antibiotics) Hives     ROS  Review of Systems   Constitutional: Negative for chills, fever and weight loss. Gastrointestinal: Negative for abdominal pain, constipation and diarrhea. Genitourinary: Negative for dysuria, frequency and urgency. +vaginal itching   Skin: Negative for rash. Physical Exam:  Visit Vitals  /73 (BP 1 Location: Left arm, BP Patient Position: Sitting)   Pulse 83   Temp 99 °F (37.2 °C) (Oral)   Resp 16   Ht 5' 3.75\" (1.619 m)   Wt 194 lb (88 kg)   LMP 01/27/2020   SpO2 99%   BMI 33.56 kg/m²       Wt Readings from Last 3 Encounters:   02/06/20 194 lb (88 kg)   10/14/19 195 lb (88.5 kg)   09/26/18 186 lb (84.4 kg)     BP Readings from Last 3 Encounters:   02/06/20 110/73   10/14/19 104/73   09/26/18 99/66      Physical Exam  Vitals signs reviewed. Constitutional:       Appearance: She is well-developed. HENT:      Head: Normocephalic and atraumatic. Neck:      Musculoskeletal: Normal range of motion and neck supple. Thyroid: No thyromegaly. Cardiovascular:      Rate and Rhythm: Normal rate and regular rhythm. Heart sounds: No murmur.    Pulmonary:      Effort: Pulmonary effort is normal. No respiratory distress. Breath sounds: Normal breath sounds. No wheezing or rales. Genitourinary:     Comments: Pelvic exam chaperoned by Rhonda Degroot LPN. External genitalia wnl, no visible lesions. Vaginal canal with thin watery discharge present. Cervix normal in appearance, though very retroverted. No cervical motion tenderness. Neurological:      Mental Status: She is alert. I personally reviewed the following lab results:   Recent Results (from the past 12 hour(s))   AMB POC SMEAR, STAIN & INTERPRET, WET MOUNT    Collection Time: 02/06/20  9:06 AM   Result Value Ref Range    Wet mount (POC)               Assessment     37 y.o. female with:    ICD-10-CM ICD-9-CM    1. Vaginal itching N89.8 698.1 AMB POC SMEAR, STAIN & INTERPRET, WET MOUNT      CHLAMYDIA/GC PCR      AMB POC URINALYSIS DIP STICK AUTO W/O MICRO   2. Bacterial vaginosis N76.0 616.10 metroNIDAZOLE (FLAGYL) 500 mg tablet    B96.89 041.9               Plan     1. Vaginal itching  Wet prep personally interpreted, no yeast but significant clue cells. Will also check Gc/chlamydia. Pt denied testing for syphilis or HIV. UA negative. - AMB POC SMEAR, STAIN & INTERPRET, WET MOUNT  - CHLAMYDIA/GC PCR; Future  - AMB POC URINALYSIS DIP STICK AUTO W/O MICRO    2. Bacterial vaginosis  - metroNIDAZOLE (FLAGYL) 500 mg tablet; Take 1 Tab by mouth two (2) times a day for 7 days. Dispense: 14 Tab; Refill: 0      Patient is aware that she is extremely overdue for her colposcopy. We discussed the importance of scheduling this at length today. She expressed understanding. Will remind myself to check in 1 week if she has scheduled appt. Follow-up and Dispositions    · Return if symptoms worsen or fail to improve, for Also schedule appt to return for colposcopy! .        Patient discussed with Dr. Jud Calhoun (Attending Physician)    I have discussed the diagnosis with the patient and the intended plan as seen in the above orders.  The patient has received an after-visit summary and questions were answered concerning future plans. I have discussed medication side effects and warnings with the patient as well.     Lanie Phillips MD  Family Medicine Resident  PGY-3

## 2020-02-07 LAB
C TRACH DNA SPEC QL NAA+PROBE: NEGATIVE
N GONORRHOEA DNA SPEC QL NAA+PROBE: NEGATIVE
SAMPLE TYPE: NORMAL
SERVICE CMNT-IMP: NORMAL
SPECIMEN SOURCE: NORMAL

## 2020-02-07 NOTE — PROGRESS NOTES
[address] Medicine Residency Attending Addendum:  Patient encounter was discussed on the day of the encounter with Zak Hameed MD, performing the key elements of the service. I discussed the findings, assessment and plan with the resident and agree with the resident's findings and plan as documented in the resident's note.       Connie Turcios MD, CAQSM, RMSK

## 2020-10-20 ENCOUNTER — VIRTUAL VISIT (OUTPATIENT)
Dept: FAMILY MEDICINE CLINIC | Age: 40
End: 2020-10-20

## 2020-10-20 DIAGNOSIS — T50.B95A ADVERSE EFFECT OF INFLUENZA VACCINE, INITIAL ENCOUNTER: Primary | ICD-10-CM

## 2020-10-20 PROCEDURE — 99213 OFFICE O/P EST LOW 20 MIN: CPT | Performed by: STUDENT IN AN ORGANIZED HEALTH CARE EDUCATION/TRAINING PROGRAM

## 2020-10-20 NOTE — PROGRESS NOTES
Yomaira Polanco  41 y.o. female  1980  [address] [facility]  [id number]   [address]:    Telemedicine Progress Note  Ary Aparicio DO       Encounter Date and Time: October 20, 2020 at 4:04 PM    Consent: Yomaira Polanco, who was seen by synchronous (real-time) audio-video technology, and/or her healthcare decision maker, is aware that this patient-initiated, Telehealth encounter on 10/20/2020 is a billable service, with coverage as determined by her insurance carrier. She is aware that she may receive a bill and has provided verbal consent to proceed: Yes. Chief Complaint   Patient presents with    Form Completion     History of Present Illness   Yomaira Polanco is a 41 y.o. female was evaluated by synchronous (real-time) audio-video technology from home, through a secure patient portal.    Patient presents for flu exemption form completion for work. 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 Patient has had adverse reactions to the flu shot in the with hives all over he body and nausea/diarrhea for 1-2 weeks post administration. Has received the flu vaccine 3 times in the past and has had the same adverse reactions each time. Last received in 2018. Denies any anaphylactic reactions w/ the vaccine. Review of Systems   Review of Systems   Constitutional: Negative for chills and fever. Respiratory: Negative for cough and shortness of breath. Cardiovascular: Negative for chest pain. Gastrointestinal: Negative for abdominal pain. Skin: Positive for rash (Hives w/ flu vaccine the past).        Vitals/Objective:     General: alert, cooperative, no distress   Mental  status: mental status: alert, oriented to person, place, and time, normal mood, behavior, speech, dress, motor activity, and thought processes   Resp: resp: normal effort and no respiratory distress   Neuro: neuro: no gross deficits   Skin: skin: no discoloration or lesions of concern on visible areas   Due to this being a TeleHealth evaluation, many elements of the physical examination are unable to be assessed. Assessment and Plan:   41 y.o. F presents for work form completion. 1. Adverse effect of influenza vaccine: history of hives, nausea, diarrhea after receiving flu vaccinations. - Patient to upload work flu exemption form to [address] and will complete once uploaded. - Discussed risks and benefits of flu vaccinations with patient's history of adverse effects. Time spent in direct conversation with the patient to include medical condition(s) discussed, assessment and treatment plan:       We discussed the expected course, resolution and complications of the diagnosis(es) in detail. Medication risks, benefits, costs, interactions, and alternatives were discussed as indicated. I advised her to contact the office if her condition worsens, changes or fails to improve as anticipated. She expressed understanding with the diagnosis(es) and plan. Patient understands that this encounter was a temporary measure, and the importance of further follow up and examination was emphasized. Patient verbalized understanding. Patient informed to follow up: for annual exam.    Electronically Signed: Sue Vargas DO    CPT Codes 95311-96162 for Established Patients may apply to this Telehealth Visit. POS code: 18. Modifier GT    Sundar Wild is a 41 y.o. female who was evaluated by an audio-video encounter for concerns as above. Patient identification was verified prior to start of the visit. A caregiver was present when appropriate. Due to this being a TeleHealth encounter ([facility]), evaluation of the following organ systems was limited: Vitals/Constitutional/EENT/Resp/CV/GI//MS/Neuro/Skin/Heme-Lymph-Imm.   Pursuant to the emergency declaration under the [address] [address]871 waiver authority and the [employer] Act, this Virtual Visit was conducted, with patient's (and/or legal guardian's) consent, to reduce the patient's risk of exposure to COVID-19 and provide necessary medical care. Services were provided through a synchronous discussion virtually to substitute for in-person clinic visit. I was at home. The patient was at home. History   Patients past medical, surgical and family histories were reviewed and updated. History reviewed. No pertinent past medical history. Past Surgical History:   Procedure Laterality Date    HX TUBAL LIGATION  2005     History reviewed. No pertinent family history.   Social History     Socioeconomic History    Marital status:      Spouse name: Not on file    Number of children: Not on file    Years of education: Not on file    Highest education level: Not on file   Occupational History    Not on file   Social Needs    Financial resource strain: Not on file    Food insecurity     Worry: Not on file     Inability: Not on file    Transportation needs     Medical: Not on file     Non-medical: Not on file   Tobacco Use    Smoking status: Never Smoker    Smokeless tobacco: Never Used   Substance and Sexual Activity    Alcohol use: No    Drug use: No    Sexual activity: Yes     Partners: Male     Birth control/protection: Surgical   Lifestyle    Physical activity     Days per week: Not on file     Minutes per session: Not on file    Stress: Not on file   Relationships    Social connections     Talks on phone: Not on file     Gets together: Not on file     Attends Mosque service: Not on file     Active member of club or organization: Not on file     Attends meetings of clubs or organizations: Not on file     Relationship status: Not on file    Intimate partner violence     Fear of current or ex partner: Not on file     Emotionally abused: Not on file     Physically abused: Not on file     Forced sexual activity: Not on file   Other Topics Concern    Not on file   Social History Narrative    Not on file     Patient Active Problem List   Diagnosis Code    Other insomnia G47.09    Obesity (BMI 30-39. 9) E66.9          Current Medications/Allergies   Medications and Allergies reviewed:       Allergies   Allergen Reactions    Latex Hives    Azithromycin Hives    Flu Vaccine [id number](6mos Up) Hives    Sulfa (Sulfonamide Antibiotics) Hives

## 2020-11-04 ENCOUNTER — OFFICE VISIT (OUTPATIENT)
Dept: FAMILY MEDICINE CLINIC | Age: 40
End: 2020-11-04
Payer: MEDICAID

## 2020-11-04 VITALS
HEIGHT: 60 IN | OXYGEN SATURATION: 97 % | BODY MASS INDEX: 37.66 KG/M2 | HEART RATE: 86 BPM | SYSTOLIC BLOOD PRESSURE: 118 MMHG | RESPIRATION RATE: 16 BRPM | DIASTOLIC BLOOD PRESSURE: 76 MMHG | WEIGHT: 191.8 LBS | TEMPERATURE: 97.9 F

## 2020-11-04 DIAGNOSIS — G43.909 MIGRAINE WITHOUT STATUS MIGRAINOSUS, NOT INTRACTABLE, UNSPECIFIED MIGRAINE TYPE: Primary | ICD-10-CM

## 2020-11-04 DIAGNOSIS — F41.9 ANXIETY: ICD-10-CM

## 2020-11-04 PROCEDURE — 99214 OFFICE O/P EST MOD 30 MIN: CPT | Performed by: FAMILY MEDICINE

## 2020-11-04 RX ORDER — SUMATRIPTAN 50 MG/1
50 TABLET, FILM COATED ORAL
Qty: 10 TAB | Refills: 0 | Status: SHIPPED | OUTPATIENT
Start: 2020-11-04 | End: 2020-11-04

## 2020-11-04 NOTE — PROGRESS NOTES
Subjective:   Nicolasa Weaver is an 41 y.o. female who presents for  [facility]  Chief Complaint   Patient presents with    Headache     patient is coming in for headaches that started about a week ago off and on. Pain of 4 right now but sometimes get as worse as a 8. She states yesterday it was worst, she had black dark circles and fet a little dizzy. she thinks it is because the stress and tired. No other concerns. The patient reports having severe episode of left sided headache yesterday night. The pain was pulsating located on the left side of the head associated with nausea w/o vomiting. She reports seeing \"black circles \" during the episode of the headache. No photophobia or phonophobia. No fever, no chills. No numbness, tingling, weakness in any part of the body. She came back from work, took Tylenol pm and went to sleep. This morning, she noticed some relief. Today the headache is 3-4/10. She intermittently having mild headaches which resolve by its own. She states that she is under a lot of stress recently and she thinks it attributes to her headaches. She had similar episode of the headache when she was in the [employer] many eyars ago when she was initially diagnosed with migraines. She was prescribed Imitrex in the past.    JAMAL-7 score is 12  Phq9 score is 11  No Si/HI   +family history of migraines. Allergies - reviewed: Allergies   Allergen Reactions    Latex Hives    Azithromycin Hives    Blueberry Hives    Flu Vaccine [id number](6mos Up) Hives    Sulfa (Sulfonamide Antibiotics) Hives         Medications - reviewed:  No current outpatient medications on file. No current facility-administered medications for this visit. Past Medical History - reviewed:  History reviewed. No pertinent past medical history.     Social History - reviewed:  Social History     Socioeconomic History    Marital status:      Spouse name: Not on file    Number of children: Not on file    Years of education: Not on file    Highest education level: Not on file   Occupational History    Not on file   Social Needs    Financial resource strain: Not on file    Food insecurity     Worry: Not on file     Inability: Not on file    Transportation needs     Medical: Not on file     Non-medical: Not on file   Tobacco Use    Smoking status: Never Smoker    Smokeless tobacco: Never Used   Substance and Sexual Activity    Alcohol use: No    Drug use: No    Sexual activity: Yes     Partners: Male     Birth control/protection: Surgical   Lifestyle    Physical activity     Days per week: Not on file     Minutes per session: Not on file    Stress: Not on file   Relationships    Social connections     Talks on phone: Not on file     Gets together: Not on file     Attends Confucianist service: Not on file     Active member of club or organization: Not on file     Attends meetings of clubs or organizations: Not on file     Relationship status: Not on file    Intimate partner violence     Fear of current or ex partner: Not on file     Emotionally abused: Not on file     Physically abused: Not on file     Forced sexual activity: Not on file   Other Topics Concern    Not on file   Social History Narrative    Not on file         Review of Systems   CONSTITUTIONAL: denies fever. Denies chills. EYES: denies double vision. ENT: denies sinus congestion. Denies sinus drainage  CARDIOVASCULAR: denies chest pain.  Denies palpitations  RESPIRATORY: denies shortness of breath  NEURO: +headache         Objective:     Visit Vitals  /76 (BP 1 Location: Right arm, BP Patient Position: Sitting)   Pulse 86   Temp 97.9 °F (36.6 °C) (Temporal)   Resp 16   Ht 5' (1.524 m)   Wt 191 lb 12.8 oz (87 kg)   LMP 10/30/2020   SpO2 97%   BMI 37.46 kg/m²       General appearance - alert, well appearing, and in no distress  Eyes - pupils equal and reactive, extraocular eye movements intact  Ears - bilateral TM's and external ear canals normal  Nose - normal and patent, no erythema, discharge or polyps  Mouth - mucous membranes moist, pharynx normal without lesions  Neck - supple, no significant adenopathy  Chest - clear to auscultation, no wheezes, rales or rhonchi, symmetric air entry  Heart - normal rate, regular rhythm, normal S1, S2, no murmurs, rubs, clicks or gallops  Neurological - alert, oriented, normal speech, no focal findings or movement disorder noted      Assessment:   Jerome Vincent is a 41 y.o. female who was seen today for:    ICD-10-CM ICD-9-CM    1. Migraine without status migrainosus, not intractable, unspecified migraine type  G43.909 346.90 SUMAtriptan (IMITREX) 50 mg tablet   2. Anxiety  F41.9 300.00          Plan:   1. Migraines. Rx was imitrex given. Discussed nasal spray but the patient declined. 2. Anxiety. With known precipitating event. No SI/HI. She is not interested in medications. Would like to start counseling first. Will try [facility] for now. Strict ER precautions were given          I have discussed the diagnosis with the patient and the intended plan as seen in the above orders. The patient has received an after-visit summary and questions were answered concerning future plans. I have discussed medication side effects and warnings with the patient as well. Informed pt to return to the office if new symptoms arise.       Nikole Parr MD  Family Medicine Physician

## 2020-11-04 NOTE — LETTER
NOTIFICATION RETURN TO WORK 
 
11/4/2020 10:33 AM 
 
Ms. Eva Chatman [address] [facility] [address] To Whom It May Concern: 
 
Eva Chatman is currently under the care of [address] [facility]. She will return to work on: Monday, Novemember 9, 2020. If there are questions or concerns please have the patient contact our office. Sincerely, Kathryn Henry MD

## 2020-11-04 NOTE — PATIENT INSTRUCTIONS
Migraine Headache: Care Instructions Your Care Instructions Migraines are painful, throbbing headaches that often start on one side of the head. They may cause nausea and vomiting and make you sensitive to light, sound, or smell. Without treatment, migraines can last from 4 hours to a few days. Medicines can help prevent migraines or stop them after they have started. Your doctor can help you find which ones work best for you. Follow-up care is a key part of your treatment and safety. Be sure to make and go to all appointments, and call your doctor if you are having problems. It's also a good idea to know your test results and keep a list of the medicines you take. How can you care for yourself at home? · Do not drive if you have taken a prescription pain medicine. · Rest in a quiet, dark room until your headache is gone. Close your eyes, and try to relax or go to sleep. Don't watch TV or read. · Put a cold, moist cloth or cold pack on the painful area for 10 to 20 minutes at a time. Put a thin cloth between the cold pack and your skin. · Use a warm, moist towel or a heating pad set on low to relax tight shoulder and neck muscles. · Have someone gently massage your neck and shoulders. · Take your medicines exactly as prescribed. Call your doctor if you think you are having a problem with your medicine. You will get more details on the specific medicines your doctor prescribes. · Don't take medicine for headache pain too often. Talk to your doctor if you are taking medicine more than 2 days a week to stop a headache. Taking too much pain medicine can lead to more headaches. These are called medicine-overuse headaches. To prevent migraines · Keep a headache diary so you can figure out what triggers your headaches. Avoiding triggers may help you prevent headaches. Record when each headache began, how long it lasted, and what the pain was like.  Write down any other symptoms you had with the headache, such as nausea, flashing lights or dark spots, or sensitivity to bright light or loud noise. Note if the headache occurred near your period. List anything that might have triggered the headache. Triggers may include certain foods (chocolate, cheese, wine) or odors, smoke, bright light, stress, or lack of sleep. · If your doctor has prescribed medicine for your migraines, take it as directed. You may have medicine that you take only when you get a migraine and medicine that you take all the time to help prevent migraines. ? If your doctor has prescribed medicine for when you get a headache, take it at the first sign of a migraine, unless your doctor has given you other instructions. ? If your doctor has prescribed medicine to prevent migraines, take it exactly as prescribed. Call your doctor if you think you are having a problem with your medicine. · Find healthy ways to deal with stress. Migraines are most common during or right after stressful times. Try finding ways to reduce stress like practicing mindfulness or deep breathing exercises. · Get plenty of sleep and exercise. But be careful to not push yourself too hard during exercise. It may trigger a headache. · Eat meals on a regular schedule. Avoid foods and drinks that often trigger migraines. These include chocolate, alcohol (especially red wine and port), aspartame, monosodium glutamate (MSG), and some additives found in foods (such as hot dogs, elaine, cold cuts, aged cheeses, and pickled foods). · Limit caffeine. Don't drink too much coffee, tea, or soda. But don't quit caffeine suddenly. That can also give you migraines. · Do not smoke or allow others to smoke around you. If you need help quitting, talk to your doctor about stop-smoking programs and medicines. These can increase your chances of quitting for good.  
· If you are taking birth control pills or hormone therapy, talk to your doctor about whether they are triggering your migraines. When should you call for help? Call 911 anytime you think you may need emergency care. For example, call if: 
  · You have signs of a stroke. These may include: 
? Sudden numbness, paralysis, or weakness in your face, arm, or leg, especially on only one side of your body. ? Sudden vision changes. ? Sudden trouble speaking. ? Sudden confusion or trouble understanding simple statements. ? Sudden problems with walking or balance. ? A sudden, severe headache that is different from past headaches. Call your doctor now or seek immediate medical care if: 
  · You have new or worse nausea and vomiting.  
  · You have a new or higher fever.  
  · Your headache gets much worse. Watch closely for changes in your health, and be sure to contact your doctor if: 
  · You are not getting better after 2 days (48 hours). Where can you learn more? Go to http://www.gray.com/ Enter W585 in the search box to learn more about \"Migraine Headache: Care Instructions. \" Current as of: November 20, 2019               Content Version: 12.6 © 5574-2519 [employer]. Care instructions adapted under license by [employer] (which disclaims liability or warranty for this information). If you have questions about a medical condition or this instruction, always ask your healthcare professional. [address] any warranty or liability for your use of this information.

## 2020-11-04 NOTE — PROGRESS NOTES
Nito Bay is a 41 y.o. female    Chief Complaint   Patient presents with    Headache     patient is coming in for headaches that started about a week ago off and on. Pain of 4 right now but sometimes get as worse as a 8. She states yesterday it was worst, she had back dark circles and fet a little dizzy. she thinks it is because the stress and tired. No other concerns. 1. Have you been to the ER, urgent care clinic since your last visit? Hospitalized since your last visit? No  M  2. Have you seen or consulted any other health care providers outside of the [address] [place] [address] since your last visit? Include any pap smears or colon screening. No      Visit Vitals  /76 (BP 1 Location: Right arm, BP Patient Position: Sitting)   Pulse 86   Temp 97.9 °F (36.6 °C) (Temporal)   Resp 16   Ht 5' (1.524 m)   Wt 191 lb 12.8 oz (87 kg)   SpO2 97%   BMI 37.46 kg/m²           Health Maintenance Due   Topic Date Due    DTaP/Tdap/Td series (1 - Tdap) 06/11/1999         Medication Reconciliation completed, changes noted.   Please  Update medication list.

## 2021-04-09 ENCOUNTER — VIRTUAL VISIT (OUTPATIENT)
Dept: FAMILY MEDICINE CLINIC | Age: 41
End: 2021-04-09
Payer: MEDICAID

## 2021-04-09 DIAGNOSIS — M65.331 TRIGGER MIDDLE FINGER OF RIGHT HAND: Primary | ICD-10-CM

## 2021-04-09 PROCEDURE — 99213 OFFICE O/P EST LOW 20 MIN: CPT | Performed by: STUDENT IN AN ORGANIZED HEALTH CARE EDUCATION/TRAINING PROGRAM

## 2021-04-09 NOTE — Clinical Note
Please schedule patient for a CPE with me on April 16th at 9:15 AM. She will need a PAP at this visit so please block adequate amount of time. Thanks!

## 2021-04-09 NOTE — PROGRESS NOTES
History of Present Illness:     Consent:  Patient and/or health care decision maker is aware that that she may receive a bill for this telephone service, depending on her insurance coverage, and has provided verbal consent to proceed: Yes    Patient was evaluated by synchronous (real-time) audio-video technology from home, through the [employer] portal     Chief Complaint   Patient presents with   Sun Mcintyre is a 41 y.o. female who is coming in today for a cc of finger pain. Pt has had symptoms of R middle finger pain and locking on flexion of finger ongoing now for 6 months. Initially symptoms were only at night but now they are present throughout the day. She bought a splint that she uses at night time but it has not provided much relief. She uses her R hand for work and is unable to wear the splint throughout the day. The locking and pain has caused limitations with use of her hand. She does not like taking medications and has not taken anything for pain control. Does have family hx of DM and thyroid disorders but has not been diagnosed with anything herself. PMH (REVIEWED):  No past medical history on file. Current Medications/Allergies (REVIEWED):     No current outpatient medications on file prior to visit. No current facility-administered medications on file prior to visit. Allergies   Allergen Reactions    Latex Hives    Azithromycin Hives    Blueberry Hives    Flu Vaccine [id number](6mos Up) Hives    Sulfa (Sulfonamide Antibiotics) Hives         Review of Systems:     Review of Systems   Constitutional: Negative for fever. HENT: Negative for congestion and sore throat. Respiratory: Negative for cough and shortness of breath. Gastrointestinal: Negative for diarrhea, nausea and vomiting.    Musculoskeletal:        R mid finger locking and pain          Objective:     General: alert, cooperative, no distress   Mental  status: mental status: alert, oriented to person, place, and time, normal mood, behavior, speech, dress, motor activity, and thought processes   Resp: resp: normal effort and no respiratory distress   Neuro: neuro: no gross deficits   Skin: skin: no discoloration or lesions of concern on visible areas   Due to this being a TeleHealth evaluation, many elements of the physical examination are unable to be assessed. Assessment and Plan:     1. Trigger middle finger of right hand  - We discussed conservation options: splint (pt us using this at night) and NSAIDS (pt doesn't like taking meds but I encouraged her to take nsaids for pain as needed)  - We discussed steroid injections --> she will like to come in for a complete physical and rule out DM/thyroid disorder before proceeding to an injection   - Message sent to schedule complete physical with me on 4/16 at [address] [place] [address],     Time spent in direct conversation with the patient to include medical condition(s) discussed, assessment and treatment plan:    We discussed the expected course, resolution and complications of the diagnosis(es) in detail. Medication risks, benefits, costs, interactions, and alternatives were discussed as indicated. I advised her to contact the office if her condition worsens, changes or fails to improve as anticipated. She expressed understanding with the diagnosis(es) and plan. Patient understands that this encounter was a temporary measure, and the importance of further follow up and examination was emphasized. Patient verbalized understanding. Electronically Signed: Lawson Clancy DO    Providers location when delivering service (clinic, hospital, home): Home     CPT Codes 44906-89844 for Established Patients may apply to this Telehealth Visit. POS code: 18.   Modifier GT

## 2021-04-15 NOTE — PROGRESS NOTES
History of Present Illness:     Chief Complaint   Patient presents with    Complete Physical     Patient is coming in for a complete physical. Patient states she has pain in her middle finger on her right hand. She states that this has been going on for a year. She thinks she is might be pre- menopausal. No other concerns. Randy Soto is a 41 y.o. female who is being seen today for a CPE:    Medical Hx and Medications: None   Surg Hx: In chart  FH: In chart  Allergies: Reviewed and in chart    Preventative measures:   TDAP: Due   Mammogram: Due  PAP: PADMAJA 1 with HPV Pos in 10/2019, was supposed to get a colpo but did not     Social measures:  Smoking: Has never smoked  Alcohol: Socially, on the weekends  Exercise: None  Diet: Eats randomly throughout the day. Does not have a strict diet. Concerns today:  Per my last TM note- \"Pt has had symptoms of R middle finger pain and locking on flexion of finger ongoing now for 6 months. Initially symptoms were only at night but now they are present throughout the day. She bought a splint that she uses at night time but it has not provided much relief. She uses her R hand for work and is unable to wear the splint throughout the day. The locking and pain has caused limitations with use of her hand. She does not like taking medications and has not taken anything for pain control. \" We discussed that this is likely trigger finger at this visit and to perform further labs for conditions associated with this such as DM and hypothyroidism. Also complains of mood swings before her period (starts approx 3 days before). Has been going on since bilateral tubal ligation. Feels that she is evil during this time. No SI/HI. Also interested in STD testing. Has not been sexually active since the beginning of pandemic but wants to be tested just to be sure.      PMH (REVIEWED):  History reviewed. No pertinent past medical history.     Current Medications/Allergies (REVIEWED):     No current outpatient medications on file prior to visit. No current facility-administered medications on file prior to visit. Allergies   Allergen Reactions    Latex Hives    Azithromycin Hives    Blueberry Hives    Flu Vaccine [id number](6mos Up) Hives    Sulfa (Sulfonamide Antibiotics) Hives         Review of Systems:     Review of Systems   Constitutional: Negative for chills and fever. HENT: Negative for congestion and sore throat. Respiratory: Negative for cough and shortness of breath. Cardiovascular: Negative for chest pain and palpitations. Gastrointestinal: Negative for diarrhea, nausea and vomiting. Musculoskeletal:        Trigger finger R hand    Psychiatric/Behavioral: Negative for depression and suicidal ideas. The patient is nervous/anxious. Objective:     Vitals:    04/16/21 0923   BP: 111/75   Pulse: 85   Resp: 16   Temp: 97.6 °F (36.4 °C)   TempSrc: Temporal   SpO2: 98%   Weight: 194 lb 8 oz (88.2 kg)   Height: 5' 3.75\" (1.619 m)       Physical Exam  Constitutional:       Appearance: She is well-developed. Neck:      Musculoskeletal: Normal range of motion and neck supple. Cardiovascular:      Rate and Rhythm: Normal rate and regular rhythm. Heart sounds: No murmur. No friction rub. Pulmonary:      Effort: Pulmonary effort is normal.      Breath sounds: Normal breath sounds. Genitourinary:     General: Normal vulva. Vagina: No vaginal discharge. Musculoskeletal:      Comments: R middle trigger finger noted to lock with flexion   Skin:     General: Skin is warm and dry. Neurological:      Mental Status: She is alert and oriented to person, place, and time. Recent Labs:  No results found for this or any previous visit (from the past 12 hour(s)). Assessment and Plan:     1. Encounter for screening mammogram for malignant neoplasm of breast  - PHANI MAMMO BI SCREENING INCL CAD; Future    2.  Screening for diabetes mellitus  - HEMOGLOBIN A1C WITH EAG; Future    3. Screening for hyperlipidemia  - LIPID PANEL; Future    4. Trigger middle finger of right hand Will consider steroid injection after labs are back. Discussed benefits of injection in detail.   - CBC W/O DIFF; Future  - METABOLIC PANEL, BASIC; Future  - HEMOGLOBIN A1C WITH EAG; Future  - TSH 3RD GENERATION; Future    5. Dysplasia of cervix, low grade (PADMAJA 1) Was supposed to have a colpo performed in 2019 but didn't, will repeat and schedule colpo as appropraite  - PAP IG, APTIMA HPV AND RFX 16/18,45 ([id number]); Future    6. Screening for STDs (sexually transmitted diseases)  - HIV 1/2 AG/AB, 4TH GENERATION,W RFLX CONFIRM; Future  - HEP B SURFACE AB; Future  - CHLAMYDIA/GC PCR; Future  - T PALLIDUM AB; Future    7. PMS (premenstrual syndrome): JAMAL-7:4, PHQ-9: 3 but patient feels like symptoms worsen during menses and she would like to be on a medication  - FLUoxetine (PROzac) 20 mg capsule; Take 1 Cap by mouth daily. Dispense: 30 Cap; Refill: 1  - Follow up in 2 weeks with me over TM    8. Well woman exam with routine gynecological exam  - ME DEPRESSION SCREEN ANNUAL      Steven Tapia,       We discussed the expected course, resolution and complications of the diagnosis(es) in detail. Medication risks, benefits, costs, interactions, and alternatives were discussed as indicated. I advised her to contact the office if her condition worsens, changes or fails to improve as anticipated. She expressed understanding with the diagnosis(es) and plan.

## 2021-04-16 ENCOUNTER — OFFICE VISIT (OUTPATIENT)
Dept: FAMILY MEDICINE CLINIC | Age: 41
End: 2021-04-16
Payer: MEDICAID

## 2021-04-16 VITALS
RESPIRATION RATE: 16 BRPM | TEMPERATURE: 97.6 F | HEIGHT: 64 IN | WEIGHT: 194.5 LBS | SYSTOLIC BLOOD PRESSURE: 111 MMHG | HEART RATE: 85 BPM | BODY MASS INDEX: 33.2 KG/M2 | DIASTOLIC BLOOD PRESSURE: 75 MMHG | OXYGEN SATURATION: 98 %

## 2021-04-16 DIAGNOSIS — N94.3 PMS (PREMENSTRUAL SYNDROME): ICD-10-CM

## 2021-04-16 DIAGNOSIS — Z13.1 SCREENING FOR DIABETES MELLITUS: ICD-10-CM

## 2021-04-16 DIAGNOSIS — Z11.3 SCREENING FOR STDS (SEXUALLY TRANSMITTED DISEASES): ICD-10-CM

## 2021-04-16 DIAGNOSIS — Z13.220 SCREENING FOR HYPERLIPIDEMIA: ICD-10-CM

## 2021-04-16 DIAGNOSIS — M65.331 TRIGGER MIDDLE FINGER OF RIGHT HAND: Primary | ICD-10-CM

## 2021-04-16 DIAGNOSIS — N87.0 DYSPLASIA OF CERVIX, LOW GRADE (CIN 1): ICD-10-CM

## 2021-04-16 DIAGNOSIS — Z12.31 ENCOUNTER FOR SCREENING MAMMOGRAM FOR MALIGNANT NEOPLASM OF BREAST: ICD-10-CM

## 2021-04-16 DIAGNOSIS — Z01.419 WELL WOMAN EXAM WITH ROUTINE GYNECOLOGICAL EXAM: ICD-10-CM

## 2021-04-16 PROCEDURE — 99214 OFFICE O/P EST MOD 30 MIN: CPT | Performed by: STUDENT IN AN ORGANIZED HEALTH CARE EDUCATION/TRAINING PROGRAM

## 2021-04-16 PROCEDURE — 99396 PREV VISIT EST AGE 40-64: CPT | Performed by: STUDENT IN AN ORGANIZED HEALTH CARE EDUCATION/TRAINING PROGRAM

## 2021-04-16 RX ORDER — FLUOXETINE HYDROCHLORIDE 20 MG/1
20 CAPSULE ORAL DAILY
Qty: 30 CAP | Refills: 1 | Status: SHIPPED | OUTPATIENT
Start: 2021-04-16 | End: 2022-07-15

## 2021-04-16 NOTE — PROGRESS NOTES
Jose Alberto Ferris is a 41 y.o. female    Chief Complaint   Patient presents with    Complete Physical     Patient is coming in for a complete physical. Patient states she has pain in her middle finger on her right hand. She states that this has been going on for a year. She thinks she is might be pre- menopausal. No other concerns. 1. Have you been to the ER, urgent care clinic since your last visit? Hospitalized since your last visit? No  M  2. Have you seen or consulted any other health care providers outside of the [address] [place] [address] since your last visit? Include any pap smears or colon screening. No      Visit Vitals  /75 (BP 1 Location: Right upper arm, BP Patient Position: Sitting)   Pulse 85   Temp 97.6 °F (36.4 °C) (Temporal)   Resp 16   Ht 5' 3.75\" (1.619 m)   Wt 194 lb 8 oz (88.2 kg)   SpO2 98%   BMI 33.65 kg/m²           Health Maintenance Due   Topic Date Due    COVID-19 Vaccine (1) Never done    DTaP/Tdap/Td series (1 - Tdap) Never done         Medication Reconciliation completed, changes noted.   Please  Update medication list.

## 2021-04-21 LAB
CYTOLOGIST CVX/VAG CYTO: ABNORMAL
CYTOLOGY CVX/VAG DOC CYTO: ABNORMAL
CYTOLOGY CVX/VAG DOC THIN PREP: ABNORMAL
DX ICD CODE: ABNORMAL
DX ICD CODE: ABNORMAL
HPV I/H RISK 4 DNA CVX QL PROBE+SIG AMP: POSITIVE
Lab: ABNORMAL
OTHER STN SPEC: ABNORMAL
PATHOLOGIST CVX/VAG CYTO: ABNORMAL
STAT OF ADQ CVX/VAG CYTO-IMP: ABNORMAL

## 2021-04-22 ENCOUNTER — TELEPHONE (OUTPATIENT)
Dept: FAMILY MEDICINE CLINIC | Age: 41
End: 2021-04-22

## 2021-04-22 DIAGNOSIS — N87.0 DYSPLASIA OF CERVIX, LOW GRADE (CIN 1): Primary | ICD-10-CM

## 2021-04-22 RX ORDER — ALPRAZOLAM 1 MG/1
1 TABLET ORAL ONCE
Qty: 1 TAB | Refills: 0 | Status: SHIPPED | OUTPATIENT
Start: 2021-04-22 | End: 2021-04-22

## 2021-04-22 NOTE — PROGRESS NOTES
Cherelle scheduled on May 5th at 9:30 AM. Discussed with patient.  Will send one pill of xanax to be taken prior to procedure as she has done in the past.

## 2021-04-30 ENCOUNTER — VIRTUAL VISIT (OUTPATIENT)
Dept: FAMILY MEDICINE CLINIC | Age: 41
End: 2021-04-30
Payer: MEDICAID

## 2021-04-30 DIAGNOSIS — N94.3 PMS (PREMENSTRUAL SYNDROME): Primary | ICD-10-CM

## 2021-04-30 PROCEDURE — 99212 OFFICE O/P EST SF 10 MIN: CPT | Performed by: STUDENT IN AN ORGANIZED HEALTH CARE EDUCATION/TRAINING PROGRAM

## 2021-04-30 NOTE — PROGRESS NOTES
History of Present Illness:     Consent:  Patient and/or health care decision maker is aware that that she may receive a bill for this telephone service, depending on her insurance coverage, and has provided verbal consent to proceed: Yes    Patient was evaluated by synchronous (real-time) audio-video technology from home, through the [employer] portal     Chief Complaint   Patient presents with   Odin Dunham is a 41 y.o. female who is following up today for Prozac 20 mg which was started due to PMS symptoms. Has been taking it for about 2 weeks now. Has not had any SE yet. Has not had her menses yet so not sure if it is helping with any PMS symptoms. Feels that in general she is not feeling as chavira but she still has to wait until her next menses to really ensure it is working. No SI/HI. PMH (REVIEWED):  History reviewed. No pertinent past medical history. Current Medications/Allergies (REVIEWED):     Current Outpatient Medications on File Prior to Visit   Medication Sig Dispense Refill    FLUoxetine (PROzac) 20 mg capsule Take 1 Cap by mouth daily. 30 Cap 1     No current facility-administered medications on file prior to visit. Allergies   Allergen Reactions    Latex Hives    Azithromycin Hives    Blueberry Hives    Flu Vaccine [id number](6mos Up) Hives    Sulfa (Sulfonamide Antibiotics) Hives         Review of Systems:     Review of Systems   Constitutional: Negative for chills and fever. HENT: Negative for congestion and sore throat. Respiratory: Negative for cough and shortness of breath. Cardiovascular: Negative for chest pain and palpitations. Gastrointestinal: Negative for diarrhea, nausea and vomiting. Psychiatric/Behavioral: Negative for depression and suicidal ideas.         Objective:     General: alert, cooperative, no distress   Mental  status: mental status: alert, oriented to person, place, and time, normal mood, behavior, speech, dress, motor activity, and thought processes   Resp: resp: normal effort and no respiratory distress   Neuro: neuro: no gross deficits   Skin: skin: no discoloration or lesions of concern on visible areas   Due to this being a TeleHealth evaluation, many elements of the physical examination are unable to be assessed. Assessment and Plan:       ICD-10-CM ICD-9-CM    1. PMS (premenstrual syndrome)  N94.3 625.4      Pt to continue current dose of Prozac. She will make me aware through mychart if she develops any SE or if the medication does not help her PMS symptoms at the 4-6 weeks hien    Follow up: For colpo with Dr. Dm Boateng on 5/5    Irwin Gomez DO      Time spent in direct conversation with the patient to include medical condition(s) discussed, assessment and treatment plan:    We discussed the expected course, resolution and complications of the diagnosis(es) in detail. Medication risks, benefits, costs, interactions, and alternatives were discussed as indicated. I advised her to contact the office if her condition worsens, changes or fails to improve as anticipated. She expressed understanding with the diagnosis(es) and plan. Patient understands that this encounter was a temporary measure, and the importance of further follow up and examination was emphasized. Patient verbalized understanding. Electronically Signed: Irwin Gomez DO    Providers location when delivering service (clinic, hospital, home): Home     CPT Codes 49814-25145 for Established Patients may apply to this Telehealth Visit. POS code: 18.   Modifier GT

## 2021-05-04 LAB
BUN SERPL-MCNC: 9 MG/DL (ref 6–24)
BUN/CREAT SERPL: 9 (ref 9–23)
CALCIUM SERPL-MCNC: 9.9 MG/DL (ref 8.7–10.2)
CHLORIDE SERPL-SCNC: 103 MMOL/L (ref 96–106)
CHOLEST SERPL-MCNC: 232 MG/DL (ref 100–199)
CO2 SERPL-SCNC: 22 MMOL/L (ref 20–29)
CREAT SERPL-MCNC: 1.01 MG/DL (ref 0.57–1)
ERYTHROCYTE [DISTWIDTH] IN BLOOD BY AUTOMATED COUNT: 12.5 % (ref 11.7–15.4)
EST. AVERAGE GLUCOSE BLD GHB EST-MCNC: 105 MG/DL
GLUCOSE SERPL-MCNC: 76 MG/DL (ref 65–99)
HBA1C MFR BLD: 5.3 % (ref 4.8–5.6)
HCT VFR BLD AUTO: 41.9 % (ref 34–46.6)
HDLC SERPL-MCNC: 69 MG/DL
HGB BLD-MCNC: 13.8 G/DL (ref 11.1–15.9)
IMP & REVIEW OF LAB RESULTS: NORMAL
LDLC SERPL CALC-MCNC: 123 MG/DL (ref 0–99)
MCH RBC QN AUTO: 30.1 PG (ref 26.6–33)
MCHC RBC AUTO-ENTMCNC: 32.9 G/DL (ref 31.5–35.7)
MCV RBC AUTO: 92 FL (ref 79–97)
PLATELET # BLD AUTO: 299 X10E3/UL (ref 150–450)
POTASSIUM SERPL-SCNC: 3.9 MMOL/L (ref 3.5–5.2)
RBC # BLD AUTO: 4.58 X10E6/UL (ref 3.77–5.28)
SODIUM SERPL-SCNC: 141 MMOL/L (ref 134–144)
TRIGL SERPL-MCNC: 232 MG/DL (ref 0–149)
TSH SERPL DL<=0.005 MIU/L-ACNC: 2.97 UIU/ML (ref 0.45–4.5)
VLDLC SERPL CALC-MCNC: 40 MG/DL (ref 5–40)
WBC # BLD AUTO: 8.7 X10E3/UL (ref 3.4–10.8)

## 2021-05-06 LAB
C TRACH RRNA SPEC QL NAA+PROBE: NEGATIVE
HBV SURFACE AB SER QL: REACTIVE
HIV 1+2 AB+HIV1 P24 AG SERPL QL IA: NON REACTIVE
N GONORRHOEA RRNA SPEC QL NAA+PROBE: NEGATIVE
TREPONEMA PALLIDUM IGG+IGM AB [PRESENCE] IN SERUM OR PLASMA BY IMMUNOASSAY: NON REACTIVE

## 2021-05-06 NOTE — PROGRESS NOTES
Made pt aware of normal STD screening results and confirm that pt is aware of appointment with Dr. Carmen Bonilla tomorrow at 9:30 AM.

## 2021-05-07 ENCOUNTER — OFFICE VISIT (OUTPATIENT)
Dept: FAMILY MEDICINE CLINIC | Age: 41
End: 2021-05-07
Payer: MEDICAID

## 2021-05-07 VITALS
SYSTOLIC BLOOD PRESSURE: 101 MMHG | RESPIRATION RATE: 16 BRPM | DIASTOLIC BLOOD PRESSURE: 67 MMHG | OXYGEN SATURATION: 98 % | HEART RATE: 80 BPM | BODY MASS INDEX: 32.7 KG/M2 | WEIGHT: 189 LBS

## 2021-05-07 DIAGNOSIS — N87.0 DYSPLASIA OF CERVIX, LOW GRADE (CIN 1): Primary | ICD-10-CM

## 2021-05-07 LAB
HCG URINE, QL. (POC): NEGATIVE
VALID INTERNAL CONTROL?: YES

## 2021-05-07 PROCEDURE — 57454 BX/CURETT OF CERVIX W/SCOPE: CPT | Performed by: FAMILY MEDICINE

## 2021-05-07 PROCEDURE — 81025 URINE PREGNANCY TEST: CPT | Performed by: FAMILY MEDICINE

## 2021-05-07 NOTE — PROGRESS NOTES
Chief Complaint   Patient presents with    Colposcopy     1. Have you been to the ER, urgent care clinic since your last visit? Hospitalized since your last visit? no    2. Have you seen or consulted any other health care providers outside of the [address] [place] [address] since your last visit? Include any pap smears or colon screening.   no

## 2021-05-07 NOTE — PROGRESS NOTES
Colposcopy Procedure Note    Nelida Deutsch is a 41 y.o. F who is here for colposcopy. She took Xanax prior to the procedure due to hx of difficulty with pelvic exams. Pap smear on 4/2019 showed LSIL, HPV+, reflexed to 16,18,45 ordered but lab did not perform reflex. Dysplasia Hx:   2017 pap ASCUS HPV+  2017 colpo neg   2018 pap LSIL HPV+ 16,18,45 neg  2018 colpo CIN1, ECC with dysplasia but could not grade   2019 pap LSIL, HPV+ 16,18,45  Scheduled for colpo in 2019 and did not return     UPT neg today    [facility]  OFFICE PROCEDURE PROGRESS NOTE    Chart reviewed for the following:   Shantelle CORONEL DO, have reviewed the History, Physical and updated the Allergic reactions for [address] performed immediately prior to start of procedure:   Shantelle CORONEL DO, have performed the following reviews on Nelida Deutsch prior to the start of the procedure:            * Patient was identified by name and date of birth   * Agreement on procedure being performed was verified  * Risks and Benefits explained to the patient  * Procedure site verified and marked as necessary  * Patient was positioned for comfort  * Consent was signed and verified     Time: 9:46 AM    Date of procedure: 5/7/2021    Procedure performed by: Orest Cowden, DO and Bhavin Wright MD    Provider assisted by: self, mom waiting in car to drive her home    How tolerated by patient: poorly tolerated    Comments: limited colpo due to poor tolerance as per details below       Procedure Details   The risks and benefits of the procedure and written informed consent obtained. Findings:  Speculum placed in vagina, pt very poorly tolerated pelvic overall, difficult time relaxing bottom and was raising bottom up off table despite repositioning and changing speculums several times, eventually was able to visualize the cervix, but pt continued to lift bottom up off table.   Attempted to visualize cervix through colposcope but very limited views due to intolerance of procedure. Decided to take 4 random biopsies at 12:00, 3:00, 6:00, 9:00 and ECC. Silver nitrate applied to 9:00 to achieve excellent hemostasis. Specimens: cervix 12:00, 3:00, 6:00, 7:09, ECC    Complications: no complications     Assessment:  41 y.o. s/p colposcopy for cervical dysplasia, hx CIN1    Plan: Will await biopsy results and base further steps as per ASCCP guidelines  When reviewing results will take into consideration limited views on colpo due to patient intolerance. Will plan to refer to gyn for futher colpos. Pt requests two pills of Xanax in the future, says this is what she has historically been prescribed but only one this time. I agree with this plan.         Emmie Jackson, DO  Family Medicine Faculty

## 2021-05-12 LAB
CPT CODES, 490044: NORMAL
CPT DISCLAIMER: NORMAL
CYTOLOGY SPEC DOC CYTO: NORMAL
DIAGNOSIS SYNOPSIS:: NORMAL
DX ICD CODE: NORMAL
PATH REPORT.GROSS SPEC: NORMAL
PATH REPORT.RELEVANT HX SPEC: NORMAL
PATHOLOGIST NAME: NORMAL
SPECIMEN SOURCE: NORMAL

## 2021-05-14 NOTE — PROGRESS NOTES
Colpo normal including ECC  Per ASCCP cotesting in 1 year, added to list to call for pap in April 2022

## 2021-05-28 ENCOUNTER — HOSPITAL ENCOUNTER (OUTPATIENT)
Dept: MAMMOGRAPHY | Age: 41
Discharge: HOME | End: 2021-05-28
Attending: STUDENT IN AN ORGANIZED HEALTH CARE EDUCATION/TRAINING PROGRAM
Payer: MEDICAID

## 2021-05-28 DIAGNOSIS — Z12.31 ENCOUNTER FOR SCREENING MAMMOGRAM FOR MALIGNANT NEOPLASM OF BREAST: ICD-10-CM

## 2021-05-28 PROCEDURE — 77067 SCR MAMMO BI INCL CAD: CPT

## 2022-03-19 PROBLEM — M65.331 TRIGGER MIDDLE FINGER OF RIGHT HAND: Status: ACTIVE | Noted: 2021-04-09

## 2022-03-19 PROBLEM — G47.09 OTHER INSOMNIA: Status: ACTIVE | Noted: 2019-10-14

## 2022-03-19 PROBLEM — E66.9 OBESITY (BMI 30-39.9): Status: ACTIVE | Noted: 2019-10-14

## 2022-03-30 ENCOUNTER — TELEPHONE (OUTPATIENT)
Dept: FAMILY MEDICINE CLINIC | Age: 42
End: 2022-03-30

## 2022-07-15 ENCOUNTER — VIRTUAL VISIT (OUTPATIENT)
Dept: FAMILY MEDICINE CLINIC | Age: 42
End: 2022-07-15
Payer: COMMERCIAL

## 2022-07-15 DIAGNOSIS — G43.909 MIGRAINE WITHOUT STATUS MIGRAINOSUS, NOT INTRACTABLE, UNSPECIFIED MIGRAINE TYPE: Primary | ICD-10-CM

## 2022-07-15 PROCEDURE — 99213 OFFICE O/P EST LOW 20 MIN: CPT | Performed by: STUDENT IN AN ORGANIZED HEALTH CARE EDUCATION/TRAINING PROGRAM

## 2022-07-15 NOTE — ASSESSMENT & PLAN NOTE
Migraines have been chronic since 1997. Has been having difficulties as no med to abort the migraines. Patient reports that sumatriptan worked well for her previously. (Of note - patient is not on Prozac - so less concerned about serotonin syndrome). Patient given return precautions should migraines change in nature or become more frequent. Discussed that patient should be seen for chronic conditions in person - would benefit from neuro exam at that time. She will call to make appt.

## 2022-07-15 NOTE — PROGRESS NOTES
Carmelita Louis  38 y.o. female  1980  [address] [place] [address],[address]  [id number]   [address]:    Telemedicine Progress Note  Ernestina Foley MD       Encounter Date and Time: July 17, 2022 at 11:03 AM    Consent: Carmelita Louis, who was seen by synchronous (real-time) audio-video technology, and/or her healthcare decision maker, is aware that this patient-initiated, Telehealth encounter on 7/15/2022 is a billable service, with coverage as determined by her insurance carrier. Chief Complaint   Patient presents with    Headache     History of Present Illness   Carmelita Louis is a 38 y.o. female was evaluated by synchronous (real-time) audio-video technology from Pt home, through a secure patient portal.     Migraines: Since 1997 - part of medical discharge from [facility]. Patient was getting imitrex before while in [employer]. Worse over the last year as they last longer since she no longer has sumatriptan prescription. Getting migraines at once a month - lasts for 3 days. Will get temple throbbing just before getting full migraine. Double vision and dizziness occurs with it - has been chronic and how her migraines have always presented themselves. Can get vomiting with them as well. When she had sumatriptan in the past and it worked for her - no adverse effects with the sumatriptan. Had head imaging done while in [employer] and that was negative. Of note, patient NOT taking prozac listed in chart. Last vitals in June at [facility] visit: blood pressure 111/80, pulse 84.   Review of Systems   ROS- see HPI    Vitals/Objective:     General: alert, cooperative, no distress   Mental  status: mental status: normal mood, behavior, speech, dress, motor activity, and thought processes   Resp: resp: normal effort and no respiratory distress   Neuro: neuro: no gross deficits   Skin: skin: no discoloration or lesions of concern on visible areas   Due to this being a TeleHealth evaluation, many elements of the physical examination are unable to be assessed. Assessment and Plan:       1. Migraine without status migrainosus, not intractable, unspecified migraine type  Assessment & Plan:  Migraines have been chronic since 1997. Has been having difficulties as no med to abort the migraines. Patient reports that sumatriptan worked well for her previously. (Of note - patient is not on Prozac - so less concerned about serotonin syndrome). Patient given return precautions should migraines change in nature or become more frequent. Discussed that patient should be seen for chronic conditions in person - would benefit from neuro exam at that time. She will call to make appt. Orders:  -     SUMAtriptan (IMITREX) 50 mg tablet; Take one tablet at onset of migraine. Can take another tablet 2 hours later if still having symptoms. Do not exceed 200mg in 24h period. , Normal, Disp-30 Tablet, R-0    Time spent in direct conversation with the patient to include medical condition(s) discussed, assessment and treatment plan:        We discussed the expected course, resolution and complications of the diagnosis(es) in detail. Medication risks, benefits, costs, interactions, and alternatives were discussed as indicated. I advised her to contact the office if her condition worsens, changes or fails to improve as anticipated. She expressed understanding with the diagnosis(es) and plan. Patient understands that this encounter was a temporary measure, and the importance of further follow up and examination was emphasized. Patient verbalized understanding. Patient informed to follow up: Follow-up and Dispositions  ·   Return in about 2 weeks (around 7/29/2022) for chronic conditions;. Follow-up and Disposition History         Electronically Signed: MD Jimmy Harrell is a 38 y.o. female who was evaluated by an audio-video encounter for concerns as above.  Patient identification was verified prior to start of the visit. A caregiver was present when appropriate. Due to this being a TeleHealth encounter (During QSL-49 public health emergency), evaluation of the following organ systems was limited: Vitals/Constitutional/EENT/Resp/CV/GI//MS/Neuro/Skin/Heme-Lymph-Imm. Pursuant to the emergency declaration under the [facility] [place] [address] waiver authority and the [employer] Act, this Virtual Visit was conducted, with patient's (and/or legal guardian's) consent, to reduce the patient's risk of exposure to COVID-19 and provide necessary medical care. Services were provided through a synchronous discussion virtually to substitute for in-person clinic visit. I was at home. The patient was in the office. History   Patients past medical, surgical and family histories were reviewed and updated. No past medical history on file. Past Surgical History:   Procedure Laterality Date    HX TUBAL LIGATION  2005     No family history on file. Social History     Tobacco Use    Smoking status: Never Smoker    Smokeless tobacco: Never Used   Substance Use Topics    Alcohol use: No    Drug use: No     Patient Active Problem List   Diagnosis Code    Other insomnia G47.09    Obesity (BMI 30-39. 9) E66.9    Trigger middle finger of right hand M65.331    Migraine without status migrainosus, not intractable G43.909          Current Medications/Allergies   Medications and Allergies reviewed:       Allergies   Allergen Reactions    Latex Hives    Azithromycin Hives    Blueberry Hives    Flu Vaccine [id number](6mos Up) Hives    Sulfa (Sulfonamide Antibiotics) Hives

## 2022-07-17 RX ORDER — SUMATRIPTAN 50 MG/1
TABLET, FILM COATED ORAL
Qty: 30 TABLET | Refills: 0 | Status: SHIPPED | OUTPATIENT
Start: 2022-07-17

## 2022-07-21 NOTE — PROGRESS NOTES
[address] Medicine Residency Attending Addendum:  Dr. Brittnee Tapia MD,  the patient and I were not physically present during this encounter. The resident and I are concurrently monitoring the patient care through appropriate telecommunication technology. I discussed the findings, assessment and plan with the resident and agree with the resident's findings and plan as documented in the resident's note.       Yaa Gutiérrez MD

## 2022-08-19 ENCOUNTER — OFFICE VISIT (OUTPATIENT)
Dept: FAMILY MEDICINE CLINIC | Age: 42
End: 2022-08-19
Payer: COMMERCIAL

## 2022-08-19 VITALS
BODY MASS INDEX: 34.31 KG/M2 | RESPIRATION RATE: 18 BRPM | SYSTOLIC BLOOD PRESSURE: 102 MMHG | HEIGHT: 64 IN | OXYGEN SATURATION: 96 % | WEIGHT: 201 LBS | DIASTOLIC BLOOD PRESSURE: 68 MMHG | TEMPERATURE: 98.7 F | HEART RATE: 98 BPM

## 2022-08-19 DIAGNOSIS — M54.50 ACUTE BILATERAL LOW BACK PAIN WITHOUT SCIATICA: Primary | ICD-10-CM

## 2022-08-19 PROCEDURE — 99213 OFFICE O/P EST LOW 20 MIN: CPT | Performed by: STUDENT IN AN ORGANIZED HEALTH CARE EDUCATION/TRAINING PROGRAM

## 2022-08-19 RX ORDER — CYCLOBENZAPRINE HCL 10 MG
5 TABLET ORAL
Qty: 30 TABLET | Refills: 0 | Status: SHIPPED | OUTPATIENT
Start: 2022-08-19

## 2022-08-19 NOTE — PROGRESS NOTES
[address] [address]   Office [phone], Fax [phone]    Subjective:     Chief Complaint   Patient presents with    Back Pain     Patient with lower back pain x 2 days, no known injury to the area. Patient having spasms and pain. HPI:  Tim Meredith is a 38 y.o. female that presents for: lower back pain, which started 2 days ago, with slight worsening yesterday and minimal improvement today. Yesterday it was 9 out of 10 in pain scale with improvement to 4-5 out of 10 today. She only has been using heating pads with some improvement and moving around or picking up things seem to make the pain worse. She denies any trauma or any change in activity prior to onset of this pain. She also denies any urinary complaints, CP, SOB, incontinence or any other complaints at this time. Patient reports hx of backs spasms. In 2017, relieved with muscle relaxants and rest.         ROS:   Review of Systems   Constitutional:  Negative for chills and fever. HENT:  Negative for sore throat. Eyes:  Negative for blurred vision. Respiratory:  Negative for shortness of breath. Cardiovascular:  Negative for chest pain. Gastrointestinal:  Negative for diarrhea, nausea and vomiting. Genitourinary:  Negative for dysuria, frequency and urgency. Musculoskeletal:  Positive for back pain. Negative for falls. Neurological:  Negative for weakness. Health Maintenance:  Health Maintenance Due   Topic Date Due    COVID-19 Vaccine (1) Never done    DTaP/Tdap/Td series (1 - Tdap) Never done    Depression Screen  04/16/2022        Past Medical Hx  I personally reviewed. History reviewed. No pertinent past medical history. SocHx   I personally reviewed.   Social History     Socioeconomic History    Marital status:      Spouse name: Not on file    Number of children: Not on file    Years of education: Not on file    Highest education level: Not on file   Occupational History Not on file   Tobacco Use    Smoking status: Never    Smokeless tobacco: Never   Substance and Sexual Activity    Alcohol use: No    Drug use: No    Sexual activity: Yes     Partners: Male     Birth control/protection: Surgical   Other Topics Concern    Not on file   Social History Narrative    Not on file     Social Determinants of Health     Financial Resource Strain: Not on file   Food Insecurity: Not on file   Transportation Needs: Not on file   Physical Activity: Not on file   Stress: Not on file   Social Connections: Not on file   Intimate Partner Violence: Not on file   Housing Stability: Not on file        Allergies  I personally reviewed. Allergies   Allergen Reactions    Latex Hives    Azithromycin Hives    Blueberry Hives    Flu Vaccine [id number](6mos Up) Hives    Sulfa (Sulfonamide Antibiotics) Hives        Medications  I personally reviewed. Current Outpatient Medications on File Prior to Visit   Medication Sig Dispense Refill    SUMAtriptan (IMITREX) 50 mg tablet Take one tablet at onset of migraine. Can take another tablet 2 hours later if still having symptoms. Do not exceed 200mg in 24h period. 30 Tablet 0     No current facility-administered medications on file prior to visit. Objective:   Vitals  I personally reviewed. Visit Vitals  /68 (BP 1 Location: Right arm, BP Patient Position: Sitting, BP Cuff Size: Large adult)   Pulse 98   Temp 98.7 °F (37.1 °C) (Oral)   Resp 18   Ht 5' 3.75\" (1.619 m)   Wt 201 lb (91.2 kg)   LMP 11/27/2021   SpO2 96%   BMI 34.77 kg/m²        Physical Exam  Physical Exam     Vitals Reviewed. General AO x 3. No distress. Not diaphoretic. No jaundice. No cyanosis. No pallor. Neck No thyromegaly present. No JVD. No cervical adenopathy. Cardio Normal rate, regular rhythm. No murmur, rubs, or gallop. Pulmonary Effort normal. CTAB. No wheezes, rales, or rhonchi. Abdominal Soft. Bowel sounds normal. No tenderness. No masses. No distension. Back Point tenderness present bilaterally in paraspinal region at L4-L5. Neg leg raise test bilaterally    Extremities No edema of lower extremities. Pulses present. Neurological CN II-XII grossly intact. No focal deficits. Skin No rash. Assessment/Plan:       Diagnoses and all orders for this visit:    1. Acute bilateral low back pain without sciatica - 2 day hx with no trauma, improving somewhat with heating pad. Pt with hx of muscle spasms in 2017, relieved with rest and muscle relaxants. PE remarkable for point tenderness at paraspinal muscles at L4-L5 region. Advised pt that if symptoms do not improve to make an appointment with sports medicine clinic for possible trigger injections. -     cyclobenzaprine (FLEXERIL) 10 mg tablet; Take 0.5 Tablets by mouth three (3) times daily as needed for Muscle Spasm(s). Follow up: Follow-up and Dispositions    Return in about 4 weeks (around 9/16/2022) for Annual physical .            Pt was discussed with Dr Corby Carson (attending physician). I have reviewed patient medical and social history and medications. I have reviewed pertinent labs results and other data. I have discussed the diagnosis with the patient and the intended plan as seen in the above orders. The patient has received an after-visit summary and questions were answered concerning future plans. I have discussed medication side effects and warnings with the patient as well.     Marshal Nassar MD  Resident [address] [place] [address]  08/19/22

## 2022-08-19 NOTE — PROGRESS NOTES
Identified Patient with two Patient identifiers (Name and ). Two Patient Identifiers confirmed. Reviewed record in preparation for visit and have obtained necessary documentation. Chief Complaint   Patient presents with    Back Pain     Patient with lower back pain x 2 days, no known injury to the area. Patient having spasms and pain. Visit Vitals  /68 (BP 1 Location: Right arm, BP Patient Position: Sitting, BP Cuff Size: Large adult)   Pulse 98   Temp 98.7 °F (37.1 °C) (Oral)   Resp 18   Ht 5' 3.75\" (1.619 m)   Wt 201 lb (91.2 kg)   SpO2 96%   BMI 34.77 kg/m²       1. Have you been to the ER, urgent care clinic since your last visit? Hospitalized since your last visit? No    2. Have you seen or consulted any other health care providers outside of the [address] [place] [address] since your last visit? Include any pap smears or colon screening.  No

## 2023-03-30 ENCOUNTER — VIRTUAL VISIT (OUTPATIENT)
Dept: FAMILY MEDICINE CLINIC | Age: 43
End: 2023-03-30

## 2023-03-30 DIAGNOSIS — G43.909 MIGRAINE WITHOUT STATUS MIGRAINOSUS, NOT INTRACTABLE, UNSPECIFIED MIGRAINE TYPE: Primary | ICD-10-CM

## 2023-03-30 NOTE — PROGRESS NOTES
[address] Medicine Residency Attending Addendum:  Dr. Samuella Galeazzi, MD,  the patient and I were not physically present during this encounter. The resident and I are concurrently monitoring the patient care through appropriate telecommunication technology. I discussed the findings, assessment and plan with the resident and agree with the resident's findings and plan as documented in the resident's note.       Luís Little MD

## 2023-03-30 NOTE — PROGRESS NOTES
Jose A Trejo  38 y.o. female  1980  [address] [place] [address],[address]  [id number]   [address]:    Telemedicine Progress Note  Debra Bardales MD       Encounter Date and Time: April 2, 2023 at 3:23 PM    Consent: Jose A Trejo, who was seen by synchronous (real-time) audio-video technology, and/or her healthcare decision maker, is aware that this patient-initiated, Telehealth encounter on 3/30/2023 is a billable service, with coverage as determined by her insurance carrier. She is aware that she may receive a bill and has provided verbal consent to proceed: Yes. Chief Complaint   Patient presents with    Headache     History of Present Illness   Jose A Trejo is a 38 y.o. female was evaluated by synchronous (real-time) audio-video technology from home, through a secure patient portal.    #migraines   - chronic, ongoing since 1997   - occur at least once monthly, most recent migraine started 3-4 days ago. Prior to that last was 2 months ago. - does endorse sensitivity to light and sound    - on imitrex in the past with benefit which was resumed 7/15/22. Has not tried any imitrex this time because did not feel much improvement  - did try half of a hydrocodone that was prescribed for recent dental work without relief   - has been trying to sleep through it without much relief   - does endorse associated nausea/vomiting   - denies fevers, neck pain    Review of Systems   General: No fevers. No chills. HENT: No congestion. No rhinorrhea. No sore throat. Eyes: No eye pain. No eye redness. Respiratory: No cough. No shortness of breath. Cardio: No chest pain. No leg swelling. No palpitations. GI: No abd pain. No n/v. No diarrhea. No constipation. : No frequency. No dysuria. No urgency. MSK: No back pain. No neck pain. Neuro: No headaches. No dizziness.      Vitals/Objective:     General: alert, cooperative, no distress   Mental  status: mental status: alert, oriented to person, place, and time, normal mood, behavior, speech, dress, motor activity, and thought processes   Resp: resp: normal effort and no respiratory distress   Neuro: neuro: no gross deficits   Skin: skin: no discoloration or lesions of concern on visible areas   Due to this being a TeleHealth evaluation, many elements of the physical examination are unable to be assessed. Assessment and Plan:   Time-based coding, delete if not needed: I spent at least 25 minutes with this established patient, and >50% of the time was spent counseling and/or coordinating care regarding migraine management. 1. Migraine without status migrainosus, not intractable, unspecified migraine type  Chronic, intermittent with current episode over the last week though doesn't appear to have frequent migraines as this appears to occur once every few months. Hydrocodone x1 taken without benefit. Has not taken Imitrex or alternate conservative therapies. - advised trialing single dose imitrex 100mg with repeat dose if no improvement not to exceed 200mg in 24 hour period  - also discussed could consider alternating tylenol 1000mg and NSAID such as ibuprofen 800mg as could have benefit with these   - if no improvement with either of these therapies, may need to consider alternate medication   - also discussed common migraine triggers to avoid to prevent further attacks     Time spent in direct conversation with the patient to include medical condition(s) discussed, assessment and treatment plan:       We discussed the expected course, resolution and complications of the diagnosis(es) in detail. Medication risks, benefits, costs, interactions, and alternatives were discussed as indicated. I advised her to contact the office if her condition worsens, changes or fails to improve as anticipated. She expressed understanding with the diagnosis(es) and plan.  Patient understands that this encounter was a temporary measure, and the importance of further follow up and examination was emphasized. Patient verbalized understanding. Patient informed to follow up: Follow-up and Dispositions    Return in about 18 days (around 4/17/2023) for Follow up, physical .         Electronically Signed: Aniyah Goodman MD    CPT Codes 59000-48374 for Established Patients may apply to this Telehealth Visit. POS code: 18. Modifier GT    Kaye Pettit is a 38 y.o. female who was evaluated by an audio-video encounter for concerns as above. Patient identification was verified prior to start of the visit. A caregiver was present when appropriate. Due to this being a TeleHealth encounter (During XIANE-71 public health emergency), evaluation of the following organ systems was limited: Vitals/Constitutional/EENT/Resp/CV/GI//MS/Neuro/Skin/Heme-Lymph-Imm. Pursuant to the emergency declaration under the [address] [place], [place] [address] waiver authority and the [employer] and [employer] Act, this Virtual Visit was conducted, with patient's (and/or legal guardian's) consent, to reduce the patient's risk of exposure to COVID-19 and provide necessary medical care. Services were provided through a synchronous discussion virtually to substitute for in-person clinic visit. I was in the office. The patient was at home. History   Patients past medical, surgical and family histories were reviewed and updated. No past medical history on file. Past Surgical History:   Procedure Laterality Date    HX TUBAL LIGATION  2005     No family history on file.   Social History     Socioeconomic History    Marital status:      Spouse name: Not on file    Number of children: Not on file    Years of education: Not on file    Highest education level: Not on file   Occupational History    Not on file   Tobacco Use    Smoking status: Never    Smokeless tobacco: Never   Substance and Sexual Activity Alcohol use: No    Drug use: No    Sexual activity: Yes     Partners: Male     Birth control/protection: Surgical   Other Topics Concern    Not on file   Social History Narrative    Not on file     Social Determinants of Health     Financial Resource Strain: Not on file   Food Insecurity: Not on file   Transportation Needs: Not on file   Physical Activity: Not on file   Stress: Not on file   Social Connections: Not on file   Intimate Partner Violence: Not on file   Housing Stability: Not on file     Patient Active Problem List   Diagnosis Code    Other insomnia G47.09    Obesity (BMI 30-39. 9) E66.9    Trigger middle finger of right hand M65.331    Migraine without status migrainosus, not intractable G43.909          Current Medications/Allergies   Medications and Allergies reviewed:    Current Outpatient Medications   Medication Sig Dispense Refill    cyclobenzaprine (FLEXERIL) 10 mg tablet Take 0.5 Tablets by mouth three (3) times daily as needed for Muscle Spasm(s). 30 Tablet 0    SUMAtriptan (IMITREX) 50 mg tablet Take one tablet at onset of migraine. Can take another tablet 2 hours later if still having symptoms. Do not exceed 200mg in 24h period.  30 Tablet 0     Allergies   Allergen Reactions    Latex Hives    Azithromycin Hives    Blueberry Hives    Flu Vaccine [id number](6mos Up) Hives    Sulfa (Sulfonamide Antibiotics) Hives

## 2023-03-30 NOTE — LETTER
NOTIFICATION RETURN TO WORK    3/30/2023 3:53 PM    Ms. Nina Valladares  [address] [place] [address]      To Whom It May Concern:    Nina Valladares is currently under the care of [facility]. She will return to work/school on: 4/3/23. If there are questions or concerns please have the patient contact our office.         Sincerely,      Reyes Pacific, MD

## 2024-04-15 ENCOUNTER — TELEPHONE (OUTPATIENT)
Age: 44
End: 2024-04-15

## 2024-04-15 NOTE — TELEPHONE ENCOUNTER
----- Message from Andressa Cooperis sent at 3/28/2024  1:15 PM EDT -----  Subject: Appointment Request    Reason for Call: New Patient/New to Provider Appointment needed: New   Patient Request Appointment    QUESTIONS    Reason for appointment request? No appointments available during search     Additional Information for Provider? Pt called in to schedule a new pt   appt. Please call pt to advise.   ---------------------------------------------------------------------------  --------------  CALL BACK INFO  [phone]; OK to leave message on voicemail  ---------------------------------------------------------------------------  --------------  SCRIPT ANSWERS

## 2024-04-15 NOTE — TELEPHONE ENCOUNTER
Patient is still considered established with us until 3/30/26. She just needs an OV. I did try calling but it rang about 10 times and no VM picked up.

## 2024-05-22 SDOH — ECONOMIC STABILITY: HOUSING INSECURITY
IN THE LAST 12 MONTHS, WAS THERE A TIME WHEN YOU DID NOT HAVE A STEADY PLACE TO SLEEP OR SLEPT IN A SHELTER (INCLUDING NOW)?: NO

## 2024-06-10 ENCOUNTER — OFFICE VISIT (OUTPATIENT)
Age: 44
End: 2024-06-10
Payer: COMMERCIAL

## 2024-06-10 VITALS
SYSTOLIC BLOOD PRESSURE: 104 MMHG | HEIGHT: 64 IN | TEMPERATURE: 98.4 F | HEART RATE: 80 BPM | DIASTOLIC BLOOD PRESSURE: 69 MMHG | WEIGHT: 206.8 LBS | BODY MASS INDEX: 35.3 KG/M2 | OXYGEN SATURATION: 98 %

## 2024-06-10 DIAGNOSIS — R20.0 NUMBNESS: ICD-10-CM

## 2024-06-10 DIAGNOSIS — Z00.00 WELLNESS EXAMINATION: ICD-10-CM

## 2024-06-10 DIAGNOSIS — Z12.11 ENCOUNTER FOR SCREENING COLONOSCOPY: ICD-10-CM

## 2024-06-10 DIAGNOSIS — R53.83 OTHER FATIGUE: Primary | ICD-10-CM

## 2024-06-10 DIAGNOSIS — N92.6 ABNORMAL MENSES: ICD-10-CM

## 2024-06-10 DIAGNOSIS — Z12.31 ENCOUNTER FOR SCREENING MAMMOGRAM FOR MALIGNANT NEOPLASM OF BREAST: ICD-10-CM

## 2024-06-10 PROCEDURE — 99214 OFFICE O/P EST MOD 30 MIN: CPT | Performed by: FAMILY MEDICINE

## 2024-06-10 RX ORDER — SEMAGLUTIDE 0.25 MG/.5ML
0.25 INJECTION, SOLUTION SUBCUTANEOUS
COMMUNITY
Start: 2024-05-15

## 2024-06-10 SDOH — ECONOMIC STABILITY: FOOD INSECURITY
WITHIN THE PAST 12 MONTHS, THE FOOD YOU BOUGHT JUST DIDN'T LAST AND YOU DIDN'T HAVE MONEY TO GET MORE.: PATIENT UNABLE TO ANSWER

## 2024-06-10 SDOH — ECONOMIC STABILITY: FOOD INSECURITY: WITHIN THE PAST 12 MONTHS, YOU WORRIED THAT YOUR FOOD WOULD RUN OUT BEFORE YOU GOT MONEY TO BUY MORE.: NEVER TRUE

## 2024-06-10 SDOH — ECONOMIC STABILITY: TRANSPORTATION INSECURITY
IN THE PAST 12 MONTHS, HAS LACK OF TRANSPORTATION KEPT YOU FROM MEETINGS, WORK, OR FROM GETTING THINGS NEEDED FOR DAILY LIVING?: NO

## 2024-06-10 SDOH — ECONOMIC STABILITY: INCOME INSECURITY: HOW HARD IS IT FOR YOU TO PAY FOR THE VERY BASICS LIKE FOOD, HOUSING, MEDICAL CARE, AND HEATING?: SOMEWHAT HARD

## 2024-06-10 ASSESSMENT — PATIENT HEALTH QUESTIONNAIRE - PHQ9
1. LITTLE INTEREST OR PLEASURE IN DOING THINGS: SEVERAL DAYS
2. FEELING DOWN, DEPRESSED OR HOPELESS: SEVERAL DAYS
SUM OF ALL RESPONSES TO PHQ QUESTIONS 1-9: 2
SUM OF ALL RESPONSES TO PHQ QUESTIONS 1-9: 2
SUM OF ALL RESPONSES TO PHQ9 QUESTIONS 1 & 2: 2
SUM OF ALL RESPONSES TO PHQ QUESTIONS 1-9: 2
SUM OF ALL RESPONSES TO PHQ QUESTIONS 1-9: 2

## 2024-06-10 NOTE — PROGRESS NOTES
Sandra Doss is a 43 y.o. female      Chief Complaint   Patient presents with    Follow-up     Note for work saying she is allowed to wear the back and hand brace she has. Wants labwork. No period in a long time. Numbness in right arm at night.       \"Have you been to the ER, urgent care clinic since your last visit?  Hospitalized since your last visit?\"    NO    “Have you seen or consulted any other health care providers outside of [facility] since your last visit?”    NO    Have you had a mammogram?”   YES - Where: [facility] to request most recent records if not in the chart    Date of last Mammogram: 5/28/2021         “Have you had a colorectal cancer screening such as a colonoscopy/FIT/Cologuard?    NO    No colonoscopy on file  No cologuard on file  No FIT/FOBT on file   No flexible sigmoidoscopy on file         Click Here for Release of Records Request    Vitals:    06/10/24 0803   BP: 104/69   Site: Left Upper Arm   Position: Sitting   Cuff Size: Large Adult   Pulse: 80   Temp: 98.4 °F (36.9 °C)   TempSrc: Oral   SpO2: 98%   Weight: 93.8 kg (206 lb 12.8 oz)   Height: 1.619 m (5' 3.74\")           Medication Reconciliation Completed, changes notes. Please Update medication list.  
normal rate, regular rhythm, normal S1, S2, no murmurs, rubs, clicks or gallops  Abdomen - soft, nontender, nondistended, no masses or organomegaly  Neurological - alert, oriented, normal speech, no focal findings or movement disorder noted  Musculoskeletal - no joint tenderness, deformity or swelling  Extremities - no pedal edema noted  Psych - normal mood and affect       Assessment/Plan   Diagnosis Orders   1. Other fatigue  TSH    Vitamin B12 & Folate      2. Numbness  Vitamin B12 & Folate      3. Wellness examination  Prolactin    Gunnar Zamroano MD, Gastroenterology, [place]    OLIVER DIGITAL SCREEN W OR WO CAD BILATERAL    Estradiol    FSH & LH    Lipid Panel    TSH    Vitamin B12 & Folate    Vitamin D 25 Hydroxy      4. Abnormal menses  Prolactin    Estradiol    FSH & LH    Lipid Panel    TSH    Vitamin D 25 Hydroxy      5. Encounter for screening mammogram for malignant neoplasm of breast  OLIVER DIGITAL SCREEN W OR WO CAD BILATERAL      6. Encounter for screening colonoscopy  Gunnar Zamorano MD, Gastroenterology, [place]          Labs ordered - pt will get drawn where she works through [facility]  Mammogram and colonoscopy referrals given   Suspect numbness is from carpal tunnel based on history, improvement with splints.  Will check B12, folate, TSH.    Letter provided to wear splints at work    I have discussed the diagnosis with the patient and the intended plan as seen in the above orders.  The patient has received an after-visit summary and questions were answered concerning future plans.  I have discussed medication side effects and warnings with the patient as well.      Fredrick Diallo, DO

## 2024-06-18 ENCOUNTER — TELEPHONE (OUTPATIENT)
Age: 44
End: 2024-06-18

## 2024-06-18 NOTE — TELEPHONE ENCOUNTER
----- Message from Lisa Parish LPN sent at 6/17/2024 11:31 AM EDT -----  Regarding: FW: Doctor's note  Contact: [phone]    ----- Message -----  From: Sandra Doss \"Vondra\"  Sent: 6/17/2024   9:19 AM EDT  To: [facility] Clinical Staff  Subject: Doctor's note                                    Good Morning,    I had an appointment on 6/10 for a physical and received a doctor's note to allow me to wear a back and hand brace at work.  When I took it to HR, I was told there has to be a date range indicated on it.  I asked if it could state \"indefinitely\" because I would need it daily, and I was told \"yes\".  Is it possible for me to have another note so I can provide to  with that information please?  Thank you

## 2024-06-19 ENCOUNTER — NURSE ONLY (OUTPATIENT)
Age: 44
End: 2024-06-19

## 2024-06-19 DIAGNOSIS — R53.83 OTHER FATIGUE: ICD-10-CM

## 2024-06-19 DIAGNOSIS — R20.0 NUMBNESS: ICD-10-CM

## 2024-06-19 DIAGNOSIS — N92.6 ABNORMAL MENSES: ICD-10-CM

## 2024-06-19 DIAGNOSIS — Z00.00 WELLNESS EXAMINATION: ICD-10-CM

## 2024-06-19 NOTE — PROGRESS NOTES
Blood was collected at patients work, per Berenice @ [facility] if blood is labeled correctly we can send to the lab. Dr Diallo notified. Request forms printed and placed in bag with blood with note about collection.

## 2024-06-20 LAB
25(OH)D3 SERPL-MCNC: 33.1 NG/ML (ref 30–100)
CHOLEST SERPL-MCNC: 234 MG/DL
ESTRADIOL SERPL-MCNC: 57.2 PG/ML
FOLATE SERPL-MCNC: 17 NG/ML (ref 5–21)
FSH SERPL-ACNC: 57.5 MIU/ML
HDLC SERPL-MCNC: 76 MG/DL
HDLC SERPL: 3.1 (ref 0–5)
LDLC SERPL CALC-MCNC: 128 MG/DL (ref 0–100)
LH SERPL-ACNC: 21.7 MIU/ML
PROLACTIN SERPL-MCNC: 12.8 NG/ML
TRIGL SERPL-MCNC: 150 MG/DL
TSH SERPL DL<=0.05 MIU/L-ACNC: 9.92 UIU/ML (ref 0.36–3.74)
VIT B12 SERPL-MCNC: 681 PG/ML (ref 193–986)
VLDLC SERPL CALC-MCNC: 30 MG/DL

## 2024-06-24 DIAGNOSIS — R79.89 ABNORMAL TSH: Primary | ICD-10-CM

## 2024-06-24 LAB — T4 FREE SERPL-MCNC: 0.8 NG/DL (ref 0.8–1.5)

## 2024-06-26 LAB — T3 SERPL-MCNC: 77 NG/DL (ref 71–180)

## 2024-07-01 DIAGNOSIS — R79.89 ABNORMAL TSH: Primary | ICD-10-CM

## 2024-07-15 ENCOUNTER — HOSPITAL ENCOUNTER (OUTPATIENT)
Facility: HOSPITAL | Age: 44
Discharge: HOME | End: 2024-07-18
Payer: COMMERCIAL

## 2024-07-15 VITALS — BODY MASS INDEX: 36.5 KG/M2 | WEIGHT: 206 LBS | HEIGHT: 63 IN

## 2024-07-15 DIAGNOSIS — Z12.31 ENCOUNTER FOR SCREENING MAMMOGRAM FOR MALIGNANT NEOPLASM OF BREAST: ICD-10-CM

## 2024-07-15 DIAGNOSIS — Z00.00 WELLNESS EXAMINATION: ICD-10-CM

## 2024-07-15 PROCEDURE — 77067 SCR MAMMO BI INCL CAD: CPT

## 2025-05-01 SDOH — ECONOMIC STABILITY: FOOD INSECURITY: WITHIN THE PAST 12 MONTHS, THE FOOD YOU BOUGHT JUST DIDN'T LAST AND YOU DIDN'T HAVE MONEY TO GET MORE.: SOMETIMES TRUE

## 2025-05-01 SDOH — ECONOMIC STABILITY: INCOME INSECURITY: IN THE LAST 12 MONTHS, WAS THERE A TIME WHEN YOU WERE NOT ABLE TO PAY THE MORTGAGE OR RENT ON TIME?: NO

## 2025-05-01 SDOH — ECONOMIC STABILITY: FOOD INSECURITY: WITHIN THE PAST 12 MONTHS, YOU WORRIED THAT YOUR FOOD WOULD RUN OUT BEFORE YOU GOT MONEY TO BUY MORE.: SOMETIMES TRUE

## 2025-05-01 SDOH — ECONOMIC STABILITY: TRANSPORTATION INSECURITY
IN THE PAST 12 MONTHS, HAS THE LACK OF TRANSPORTATION KEPT YOU FROM MEDICAL APPOINTMENTS OR FROM GETTING MEDICATIONS?: NO

## 2025-05-02 ENCOUNTER — OFFICE VISIT (OUTPATIENT)
Age: 45
End: 2025-05-02
Payer: COMMERCIAL

## 2025-05-02 VITALS
HEART RATE: 90 BPM | RESPIRATION RATE: 16 BRPM | SYSTOLIC BLOOD PRESSURE: 109 MMHG | OXYGEN SATURATION: 97 % | DIASTOLIC BLOOD PRESSURE: 72 MMHG | TEMPERATURE: 97.2 F

## 2025-05-02 DIAGNOSIS — M54.50 CHRONIC LEFT-SIDED LOW BACK PAIN WITHOUT SCIATICA: ICD-10-CM

## 2025-05-02 DIAGNOSIS — Z13.220 SCREENING CHOLESTEROL LEVEL: ICD-10-CM

## 2025-05-02 DIAGNOSIS — G89.29 CHRONIC LEFT SHOULDER PAIN: Primary | ICD-10-CM

## 2025-05-02 DIAGNOSIS — R79.89 ABNORMAL TSH: ICD-10-CM

## 2025-05-02 DIAGNOSIS — G89.29 CHRONIC LEFT-SIDED LOW BACK PAIN WITHOUT SCIATICA: ICD-10-CM

## 2025-05-02 DIAGNOSIS — M25.512 CHRONIC LEFT SHOULDER PAIN: Primary | ICD-10-CM

## 2025-05-02 DIAGNOSIS — E66.9 OBESITY (BMI 30-39.9): ICD-10-CM

## 2025-05-02 LAB
ALBUMIN SERPL-MCNC: 4 G/DL (ref 3.5–5)
ALBUMIN/GLOB SERPL: 1 (ref 1.1–2.2)
ALP SERPL-CCNC: 127 U/L (ref 45–117)
ALT SERPL-CCNC: 39 U/L (ref 12–78)
ANION GAP SERPL CALC-SCNC: 1 MMOL/L (ref 2–12)
AST SERPL-CCNC: 35 U/L (ref 15–37)
BILIRUB SERPL-MCNC: 0.4 MG/DL (ref 0.2–1)
BUN SERPL-MCNC: 13 MG/DL (ref 6–20)
BUN/CREAT SERPL: 12 (ref 12–20)
CALCIUM SERPL-MCNC: 10.1 MG/DL (ref 8.5–10.1)
CHLORIDE SERPL-SCNC: 107 MMOL/L (ref 97–108)
CHOLEST SERPL-MCNC: 264 MG/DL
CO2 SERPL-SCNC: 31 MMOL/L (ref 21–32)
CREAT SERPL-MCNC: 1.07 MG/DL (ref 0.55–1.02)
ERYTHROCYTE [DISTWIDTH] IN BLOOD BY AUTOMATED COUNT: 12.7 % (ref 11.5–14.5)
EST. AVERAGE GLUCOSE BLD GHB EST-MCNC: 114 MG/DL
GLOBULIN SER CALC-MCNC: 4 G/DL (ref 2–4)
GLUCOSE SERPL-MCNC: 96 MG/DL (ref 65–100)
HBA1C MFR BLD: 5.6 % (ref 4–5.6)
HCT VFR BLD AUTO: 44 % (ref 35–47)
HDLC SERPL-MCNC: 83 MG/DL
HDLC SERPL: 3.2 (ref 0–5)
HGB BLD-MCNC: 13.8 G/DL (ref 11.5–16)
LDLC SERPL CALC-MCNC: 143.6 MG/DL (ref 0–100)
MCH RBC QN AUTO: 28 PG (ref 26–34)
MCHC RBC AUTO-ENTMCNC: 31.4 G/DL (ref 30–36.5)
MCV RBC AUTO: 89.4 FL (ref 80–99)
NRBC # BLD: 0 K/UL (ref 0–0.01)
NRBC BLD-RTO: 0 PER 100 WBC
PLATELET # BLD AUTO: 318 K/UL (ref 150–400)
PMV BLD AUTO: 11.1 FL (ref 8.9–12.9)
POTASSIUM SERPL-SCNC: 4.1 MMOL/L (ref 3.5–5.1)
PROT SERPL-MCNC: 8 G/DL (ref 6.4–8.2)
RBC # BLD AUTO: 4.92 M/UL (ref 3.8–5.2)
SODIUM SERPL-SCNC: 139 MMOL/L (ref 136–145)
T4 FREE SERPL-MCNC: 0.9 NG/DL (ref 0.8–1.5)
TRIGL SERPL-MCNC: 187 MG/DL
TSH SERPL DL<=0.05 MIU/L-ACNC: 2.58 UIU/ML (ref 0.36–3.74)
VLDLC SERPL CALC-MCNC: 37.4 MG/DL
WBC # BLD AUTO: 9.7 K/UL (ref 3.6–11)

## 2025-05-02 PROCEDURE — 99215 OFFICE O/P EST HI 40 MIN: CPT | Performed by: FAMILY MEDICINE

## 2025-05-02 RX ORDER — CYCLOBENZAPRINE HCL 5 MG
5 TABLET ORAL 2 TIMES DAILY PRN
Qty: 20 TABLET | Refills: 0 | Status: SHIPPED | OUTPATIENT
Start: 2025-05-02 | End: 2025-05-12

## 2025-05-02 RX ORDER — CYCLOBENZAPRINE HCL 5 MG
5 TABLET ORAL 2 TIMES DAILY PRN
Qty: 10 TABLET | Refills: 0 | Status: CANCELLED | OUTPATIENT
Start: 2025-05-02 | End: 2025-05-12

## 2025-05-02 ASSESSMENT — PATIENT HEALTH QUESTIONNAIRE - PHQ9
2. FEELING DOWN, DEPRESSED OR HOPELESS: NOT AT ALL
SUM OF ALL RESPONSES TO PHQ QUESTIONS 1-9: 0
1. LITTLE INTEREST OR PLEASURE IN DOING THINGS: NOT AT ALL
SUM OF ALL RESPONSES TO PHQ QUESTIONS 1-9: 0

## 2025-05-02 NOTE — PROGRESS NOTES
Results      Assessment & Plan  1. Back pain.  - The back pain is likely muscular in nature, exacerbated by her weight.  - An x-ray is not deemed necessary at this point, we discussed why.  - Recommend aggressive weight loss, supportive care such as NSAIDs, trial of muscle relaxer.  Discussed PT, she will consider.  - A prescription for a muscle relaxant will be provided, with the caution that it may induce drowsiness. A lidocaine patch may also be considered for additional relief.     2. Shoulder pain.  - Ddx includes bursitis v frozen shoulder v biceps tendinopathy v rotator cuff pathology   - An x-ray of the shoulder will be ordered  - A referral to a sports medicine specialist will be made for further evaluation.  - Physical therapy may be beneficial and can be discussed with the sports medicine specialist.    3. Weight management.  - Her weight is likely contributing to her pain.  - Advised that losing 50 pounds would make a huge difference in how she feels.  - Discussed the importance of weight management in alleviating pain.  - Encouraged to engage in moderate physical activity and stretching as tolerated.    4. Health maintenance.  - Needs routine health maintenance visit   - A comprehensive lab workup will be conducted today to assess kidney function, thyroid levels, and diabetes status.  Schedule follow up.    - Results of these tests will guide further management and medication adjustments.    No follow-ups on file.           The patient (or guardian, if applicable) and other individuals in attendance with the patient were advised that Artificial Intelligence will be utilized during this visit to record and process the conversation to generate a clinical note. The patient (or guardian, if applicable) and other individuals in attendance at the appointment consented to the use of AI, including the recording.      An electronic signature was used to authenticate this note.  --Fredrick Diallo DO

## 2025-05-05 ENCOUNTER — RESULTS FOLLOW-UP (OUTPATIENT)
Age: 45
End: 2025-05-05

## 2025-07-28 ENCOUNTER — COMMUNITY OUTREACH (OUTPATIENT)
Age: 45
End: 2025-07-28